# Patient Record
Sex: FEMALE | Race: WHITE | NOT HISPANIC OR LATINO | Employment: UNEMPLOYED | URBAN - METROPOLITAN AREA
[De-identification: names, ages, dates, MRNs, and addresses within clinical notes are randomized per-mention and may not be internally consistent; named-entity substitution may affect disease eponyms.]

---

## 2017-01-12 ENCOUNTER — OFFICE VISIT (OUTPATIENT)
Dept: MEDICAL GROUP | Facility: PHYSICIAN GROUP | Age: 52
End: 2017-01-12
Payer: COMMERCIAL

## 2017-01-12 ENCOUNTER — PATIENT MESSAGE (OUTPATIENT)
Dept: MEDICAL GROUP | Facility: PHYSICIAN GROUP | Age: 52
End: 2017-01-12

## 2017-01-12 ENCOUNTER — HOSPITAL ENCOUNTER (OUTPATIENT)
Dept: RADIOLOGY | Facility: MEDICAL CENTER | Age: 52
End: 2017-01-12
Attending: FAMILY MEDICINE
Payer: COMMERCIAL

## 2017-01-12 VITALS
HEIGHT: 64 IN | BODY MASS INDEX: 50.02 KG/M2 | WEIGHT: 293 LBS | SYSTOLIC BLOOD PRESSURE: 138 MMHG | TEMPERATURE: 99 F | DIASTOLIC BLOOD PRESSURE: 90 MMHG | OXYGEN SATURATION: 97 % | RESPIRATION RATE: 16 BRPM | HEART RATE: 74 BPM

## 2017-01-12 DIAGNOSIS — L70.0 CYSTIC ACNE: ICD-10-CM

## 2017-01-12 DIAGNOSIS — Z12.39 SCREENING FOR BREAST CANCER: ICD-10-CM

## 2017-01-12 DIAGNOSIS — E03.8 OTHER SPECIFIED HYPOTHYROIDISM: ICD-10-CM

## 2017-01-12 DIAGNOSIS — D71 CHRONIC GRANULOMATOUS DISEASE (HCC): ICD-10-CM

## 2017-01-12 DIAGNOSIS — M25.50 POLYARTHRALGIA: ICD-10-CM

## 2017-01-12 PROCEDURE — G0202 SCR MAMMO BI INCL CAD: HCPCS

## 2017-01-12 PROCEDURE — 11900 INJECT SKIN LESIONS </W 7: CPT | Performed by: FAMILY MEDICINE

## 2017-01-12 PROCEDURE — 99214 OFFICE O/P EST MOD 30 MIN: CPT | Mod: 25 | Performed by: FAMILY MEDICINE

## 2017-01-12 NOTE — MR AVS SNAPSHOT
"        Polly Menchaca   2017 7:00 AM   Office Visit   MRN: [id number]    Department:  [facility]   Dept Phone:  [phone]    Description:  Female : 1965   Provider:  Dorys Young M.D.           Reason for Visit     Cyst on lft side of neck    Follow-Up           Allergies as of 2017     Allergen Noted Reactions    Iodine 2010       IV iodine causes syncope    Tape 2010   Rash    SILK; may use paper tape      You were diagnosed with     Other specified hypothyroidism   [9982304]       Cystic acne   [828513]       Chronic granulomatous disease (HCC)   [167853]       Adult BMI 50.0-59.9 kg/sq m (HCC)   [869119]         Vital Signs     Blood Pressure Pulse Temperature Respirations Height Weight    138/90 mmHg 74 37.2 °C (99 °F) 16 1.626 m (5' 4.02\") 136.533 kg (301 lb)    Body Mass Index Oxygen Saturation Smoking Status             51.64 kg/m2 97% Never Smoker          Basic Information     Date Of Birth Sex Race Ethnicity Preferred Language    1965 Female White Non- English      Your appointments     2017 10:30 AM   MA [id number] with RB[facility] MG 3   [facility] ([address])    [address]  [place] [address] [phone]           No deodorant, powder, perfume or lotion under the arm or breast area.            2017  7:00 AM   Established Patient with Dorys Young M.D.   [address] [address] [phone]           You will be receiving a confirmation call a few days before your appointment from our automated call confirmation system.              Problem List              ICD-10-CM Priority Class Noted - Resolved    Other specified symptom associated with female genital organs N94.89   Unknown - Present    Stress incontinence N39.3   2014 - Present    Osteoarthritis M19.90   2014 - Present    Hypothyroidism E03.9   " 9/12/2014 - Present    Insomnia G47.00   9/12/2014 - Present    JOSE on CPAP G47.33   9/12/2014 - Present    HTN (hypertension) I10   9/12/2014 - Present    Chronic granulomatous disease (HCC) D71   9/12/2014 - Present    History of cold sores Z86.19   5/2/2016 - Present    Hypersomnia G47.10   5/6/2016 - Present    Adult BMI 50.0-59.9 kg/sq m (HCC) Z68.43   1/12/2017 - Present      Health Maintenance        Date Due Completion Dates    MAMMOGRAM 5/1/2016 5/1/2015, 9/25/2014, 9/24/2014, 9/22/2014    COLON CANCER SCREENING ANNUAL FIT 12/25/2017 12/25/2016    IMM DTaP/Tdap/Td Vaccine (2 - Td) 8/20/2025 8/20/2015            Current Immunizations     Influenza Vaccine Quad Inj (Preserved) 11/3/2016    Tdap Vaccine 8/20/2015      Below and/or attached are the medications your provider expects you to take. Review all of your home medications and newly ordered medications with your provider and/or pharmacist. Follow medication instructions as directed by your provider and/or pharmacist. Please keep your medication list with you and share with your provider. Update the information when medications are discontinued, doses are changed, or new medications (including over-the-counter products) are added; and carry medication information at all times in the event of emergency situations     Allergies:  IODINE - (reactions not documented)     TAPE - Rash               Medications  Valid as of: January 12, 2017 -  7:57 AM    Generic Name Brand Name Tablet Size Instructions for use    Clindamycin Phosphate (Gel) CLEOCIN T 1 % 1 application twice daily to affected areas        Diclofenac Sodium (Tablet Delayed Response) VOLTAREN 75 MG TAKE 1 TABLET BY MOUTH TWICE DAILY        Doxycycline Hyclate (Tab) VIBRAMYCIN 100 MG Take 1 Tab by mouth 2 times a day.        DULoxetine HCl (Cap DR Particles) CYMBALTA 30 MG Take 2 Caps by mouth every day.        HydroCHLOROthiazide (Cap) MICROZIDE 12.5 MG TAKE 1 CAPSULE BY MOUTH EVERY DAY         Levothyroxine Sodium (Tab) SYNTHROID 125 MCG Take 1 Tab by mouth Every morning on an empty stomach.        Lorcaserin HCl (Tab) BELVIQ 10 MG Take 1 Tab by mouth 2 Times a Day.        Solifenacin Succinate (Tab) VESICARE 10 MG Take 1 Tab by mouth every day.        Suvorexant (Tab) Suvorexant 15 MG Take 15 mg by mouth at bedtime as needed (for insomnia).        ValACYclovir HCl (Tab) VALTREX 1 GM TAKE 1 TABLET BY MOUTH EVERY MORNING        .                 Medicines prescribed today were sent to:     [id number] - [place], [place] - [address]    [address] [place] [address]    Phone: [phone] Fax: [phone]    Open 24 Hours?: No      Medication refill instructions:       If your prescription bottle indicates you have medication refills left, it is not necessary to call your provider’s office. Please contact your pharmacy and they will refill your medication.    If your prescription bottle indicates you do not have any refills left, you may request refills at any time through one of the following ways: The online [employer] system (except Urgent Care), by calling your provider’s office, or by asking your pharmacy to contact your provider’s office with a refill request. Medication refills are processed only during regular business hours and may not be available until the next business day. Your provider may request additional information or to have a follow-up visit with you prior to refilling your medication.   *Please Note: Medication refills are assigned a new Rx number when refilled electronically. Your pharmacy may indicate that no refills were authorized even though a new prescription for the same medication is available at the pharmacy. Please request the medicine by name with the pharmacy before contacting your provider for a refill.        Your To Do List     Future Labs/Procedures Complete By Expires    US-SOFT TISSUES OF HEAD - NECK  As  directed 1/12/2018         [employer]Search Access Code: Activation code not generated  Current StackSearch Status: Active

## 2017-01-13 NOTE — PROGRESS NOTES
Chief Complaint   Patient presents with   • Cyst     on lft side of neck   • Follow-Up       HISTORY OF PRESENT ILLNESS: Patient is a 51 y.o. female established patient here today for the following concerns:    1. Other specified hypothyroidism  Here for follow up.  Continues on thyroid replacement.  Usually has an ultrasound of the thyroid nodule once per year, but has not yet heard from her surgeon.  She reports she does feel some choking at times, she has not felt any new lumps or bumps in the neck.      2. Cystic acne  Reports cystic lesion on the left lateral neck area that has improved slighly with antibiotics (doxy) but not resolved.  She is hoping it can be treated today.      3. 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  Her son passed at age 2 from hydrocephalus following multiple shunt surgeries for severe infections and found to have CGD.  Polly brought in that genetic analysis on her today.  This reveals  X linked [id number] mutation.  She has not had any serious bacterial infections.  But is concerned about her possibility for further disease implications and has asked that we research the disease for her.      4. Adult BMI 50.0-59.9 kg/sq m ([facility])  Was started on Belviq.  She has had great weight loss with 9 lbs in the last month!  She reports no side effects.  Wants to continue therapy.      5. Polyarthralgia  Patient has had wide spread joint pain, stiffness that was originally thought to be OA and fibromyalgia.  She has found some relief from the cymbalta for pain.  She is still requiring anti-inflammatories and reports her overall endurance and strength have been a struggle.  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        Past Medical, Social, and Family history reviewed and updated in EPIC    Allergies:Iodine and Tape    Current Outpatient Prescriptions   Medication Sig Dispense Refill   • BELVIQ 10 MG Tab Take 1 Tab by mouth 2 Times a  "Day.  3   • duloxetine (CYMBALTA) 30 MG Cap DR Particles Take 2 Caps by mouth every day. 30 Cap 3   • doxycycline (VIBRAMYCIN) 100 MG Tab Take 1 Tab by mouth 2 times a day. 20 Tab 0   • diclofenac EC (VOLTAREN) 75 MG Tablet Delayed Response TAKE 1 TABLET BY MOUTH TWICE DAILY 60 Tab 2   • valacyclovir (VALTREX) 1 GM Tab TAKE 1 TABLET BY MOUTH EVERY MORNING 90 Tab 0   • clindamycin (CLEOCIN T) 1 % Gel 1 application twice daily to affected areas 1 Tube 5   • SYNTHROID 125 MCG Tab Take 1 Tab by mouth Every morning on an empty stomach. 90 Tab 3   • VESICARE 10 MG tablet Take 1 Tab by mouth every day. 90 Tab 3   • Suvorexant (BELSOMRA) 15 MG Tab Take 15 mg by mouth at bedtime as needed (for insomnia). 30 Tab 3   • hydrochlorothiazide (MICROZIDE) 12.5 MG capsule TAKE 1 CAPSULE BY MOUTH EVERY DAY 90 Cap 0     No current facility-administered medications for this visit.         ROS:  Review of Systems   Constitutional: Negative for fever, chills, weight loss and malaise/fatigue.   HENT: Negative for ear pain, nosebleeds, congestion, sore throat and neck pain.    Eyes: Negative for blurred vision.   Respiratory: Negative for cough, sputum production, shortness of breath and wheezing.    Cardiovascular: Negative for chest pain, palpitations,  and leg swelling.   Gastrointestinal: Negative for heartburn, nausea, vomiting, diarrhea and abdominal pain.   Genitourinary: Negative for dysuria, urgency and frequency.   Musculoskeletal: Negative for myalgias, back pain and joint pain.   Skin: Negative for rash and itching.   Neurological: Negative for dizziness, tingling, tremors, sensory change, focal weakness and headaches.   Endo/Heme/Allergies: Does not bruise/bleed easily.   Psychiatric/Behavioral: Negative for depression, anxiety, suicidal ideas, insomnia and memory loss.      Exam:  Blood pressure 138/90, pulse 74, temperature 37.2 °C (99 °F), resp. rate 16, height 1.626 m (5' 4.02\"), weight 136.533 kg (301 lb), SpO2 97 " %.    General:  Well nourished, well developed in NAD  Head is grossly normal.  Neck: Supple without JVD   Pulmonary:  Normal effort.   Cardiovascular: Regular rate  Extremities: no clubbing, cyanosis, or edema.  Psych: affect appropriate      Please note that this dictation was created using voice recognition software. I have made every reasonable attempt to correct obvious errors, but I expect that there are errors of grammar and possibly content that I did not discover before finalizing the note.    Assessment/Plan:  1. Other specified hypothyroidism  Recheck thyroid of thyroid nodule.    - US-SOFT TISSUES OF HEAD - NECK; Future    2. Cystic acne  S/p injection, if not improving     3. Chronic granulomatous disease (HCC)  See scanned document on genetic testing report.  Patient has [id number] mutation.    From Up to Date:  X-linked carriers -- The X-linked carrier state for [id number] is not entirely silent. In affected women, lyonization (ie, the inactivation of one or the other X chromosome in every cell) leads to two populations of phagocytes: one with normal respiratory burst function and the other with impaired respiratory burst activity [90]. Therefore, X-linked CGD carriers display a characteristic mosaic pattern on respiratory burst testing of individual peripheral blood cells seen microscopically (nitroblue tetrazolium [NBT] test) or by flow cytometry (dihydrorhodamine 123 [DHR] test). (See 'Neutrophil function tests' below.)  As few as 20 percent of cells having normal respiratory burst activity is sufficient to prevent most severe bacterial and fungal infections. Thus, most female carriers of X-linked [id number] CGD mutations are not compromised in their ability to handle infectious challenges. However, carriers with less than 20 percent of normal oxidase activity due to skewed X-chromosome lyonization may present with the phenotype of mild to severe CGD [91-94]. In addition, progressive skewing of  X-chromosome inactivation with age in previously healthy carriers of [id number] null mutations can lead to late-onset manifestations of CGD [95]. Females may have other manifestations of heterozygous carriage of X-linked CGD mutations, including discoid lupus erythematosus, aphthous ulcers, chorioretinal lesions, and photosensitivity [96,97].    We will additional lab tests and refer to rheumatology to help us determine if her chronic wide spread pain and fatigue may actually be from lupus.    - REFERRAL TO RHEUMATOLOGY  - ANTI-NUCLEAR ANTIBODY SERUM; Future  - WESTERGREN SED RATE; Future  - CRP QUANTITIVE (NON-CARDIAC); Future  - RHEUMATOID ARTHRITIS FACTOR; Future  - CCP    4. Adult BMI 50.0-59.9 kg/sq m (HCC)  Continue Belviq     5. Polyarthralgia    - REFERRAL TO RHEUMATOLOGY  - ANTI-NUCLEAR ANTIBODY SERUM; Future  - WESTERGREN SED RATE; Future  - CRP QUANTITIVE (NON-CARDIAC); Future  - RHEUMATOID ARTHRITIS FACTOR; Future  - CCP    followup in 1 month.

## 2017-01-20 RX ORDER — HYDROCHLOROTHIAZIDE 12.5 MG/1
CAPSULE, GELATIN COATED ORAL
Qty: 90 CAP | Refills: 0 | Status: SHIPPED | OUTPATIENT
Start: 2017-01-20 | End: 2017-04-07 | Stop reason: SDUPTHER

## 2017-01-25 ENCOUNTER — HOSPITAL ENCOUNTER (OUTPATIENT)
Dept: LAB | Facility: MEDICAL CENTER | Age: 52
End: 2017-01-25
Attending: FAMILY MEDICINE
Payer: COMMERCIAL

## 2017-01-25 DIAGNOSIS — D71 CHRONIC GRANULOMATOUS DISEASE (HCC): ICD-10-CM

## 2017-01-25 DIAGNOSIS — M25.50 POLYARTHRALGIA: ICD-10-CM

## 2017-01-25 LAB
CRP SERPL HS-MCNC: 1.67 MG/DL (ref 0–0.75)
ERYTHROCYTE [SEDIMENTATION RATE] IN BLOOD BY WESTERGREN METHOD: 29 MM/HOUR (ref 0–30)
RHEUMATOID FACT SERPL-ACNC: <10 IU/ML (ref 0–14)

## 2017-01-25 PROCEDURE — 85652 RBC SED RATE AUTOMATED: CPT

## 2017-01-25 PROCEDURE — 86200 CCP ANTIBODY: CPT

## 2017-01-25 PROCEDURE — 86431 RHEUMATOID FACTOR QUANT: CPT

## 2017-01-25 PROCEDURE — 86140 C-REACTIVE PROTEIN: CPT

## 2017-01-25 PROCEDURE — 86038 ANTINUCLEAR ANTIBODIES: CPT

## 2017-01-25 PROCEDURE — 86039 ANTINUCLEAR ANTIBODIES (ANA): CPT

## 2017-01-25 PROCEDURE — 36415 COLL VENOUS BLD VENIPUNCTURE: CPT

## 2017-01-27 ENCOUNTER — TELEPHONE (OUTPATIENT)
Dept: MEDICAL GROUP | Facility: PHYSICIAN GROUP | Age: 52
End: 2017-01-27

## 2017-01-27 DIAGNOSIS — D71 CHRONIC GRANULOMATOUS DISEASE (HCC): ICD-10-CM

## 2017-01-27 DIAGNOSIS — M25.50 POLYARTHRALGIA: ICD-10-CM

## 2017-01-27 DIAGNOSIS — R76.8 POSITIVE ANTI-CCP TEST: ICD-10-CM

## 2017-01-27 DIAGNOSIS — R76.8 POSITIVE ANA (ANTINUCLEAR ANTIBODY): ICD-10-CM

## 2017-01-27 LAB — CCP IGG SERPL-ACNC: 22 UNITS (ref 0–19)

## 2017-01-28 LAB
NUCLEAR IGG SER QL IA: DETECTED
NUCLEAR IGG TITR SER IF: ABNORMAL {TITER}

## 2017-01-28 NOTE — TELEPHONE ENCOUNTER
----- Message from Dorys Young M.D. sent at 1/27/2017  4:52 PM PST -----  Patient has positive antibodies for possible lupus.  Recommend referral to rheumatology.  This may explain her joint pains.  Continue current medications.

## 2017-02-15 ENCOUNTER — TELEPHONE (OUTPATIENT)
Dept: MEDICAL GROUP | Facility: PHYSICIAN GROUP | Age: 52
End: 2017-02-15

## 2017-02-15 NOTE — TELEPHONE ENCOUNTER
I really think this patient deserves a look at by rheumatology.  Can you please look again?  She has X-linked chronic granulomatous disease (genetic testing completed) which increases her risk for RA and lupus with a positive JETT.

## 2017-02-15 NOTE — TELEPHONE ENCOUNTER
----- Message from Mireille Lopez sent at 2/15/2017  8:13 AM PST -----  Good morning Dr. Young.    I wanted to notify you that the referral for the above pt to [facility] has been declined at this time.  If you have any additional questions, please reach out to one of our providers. Thank you for the referral.

## 2017-02-17 ENCOUNTER — HOSPITAL ENCOUNTER (OUTPATIENT)
Dept: RADIOLOGY | Facility: MEDICAL CENTER | Age: 52
End: 2017-02-17
Attending: FAMILY MEDICINE
Payer: COMMERCIAL

## 2017-02-17 DIAGNOSIS — E03.8 OTHER SPECIFIED HYPOTHYROIDISM: ICD-10-CM

## 2017-02-17 PROCEDURE — 76536 US EXAM OF HEAD AND NECK: CPT

## 2017-02-23 ENCOUNTER — HOSPITAL ENCOUNTER (OUTPATIENT)
Facility: MEDICAL CENTER | Age: 52
End: 2017-02-23
Attending: FAMILY MEDICINE
Payer: COMMERCIAL

## 2017-02-23 ENCOUNTER — TELEPHONE (OUTPATIENT)
Dept: MEDICAL GROUP | Facility: PHYSICIAN GROUP | Age: 52
End: 2017-02-23

## 2017-02-23 ENCOUNTER — OFFICE VISIT (OUTPATIENT)
Dept: MEDICAL GROUP | Facility: PHYSICIAN GROUP | Age: 52
End: 2017-02-23
Payer: COMMERCIAL

## 2017-02-23 VITALS
WEIGHT: 293 LBS | RESPIRATION RATE: 16 BRPM | HEIGHT: 64 IN | TEMPERATURE: 97.9 F | BODY MASS INDEX: 50.02 KG/M2 | SYSTOLIC BLOOD PRESSURE: 130 MMHG | HEART RATE: 78 BPM | OXYGEN SATURATION: 95 % | DIASTOLIC BLOOD PRESSURE: 82 MMHG

## 2017-02-23 DIAGNOSIS — R31.9 HEMATURIA: ICD-10-CM

## 2017-02-23 DIAGNOSIS — M25.50 POLYARTHRALGIA: ICD-10-CM

## 2017-02-23 DIAGNOSIS — R76.8 POSITIVE ANA (ANTINUCLEAR ANTIBODY): ICD-10-CM

## 2017-02-23 DIAGNOSIS — N10 ACUTE PYELONEPHRITIS: ICD-10-CM

## 2017-02-23 DIAGNOSIS — E66.01 OBESITY, MORBID, BMI 50 OR HIGHER (HCC): ICD-10-CM

## 2017-02-23 DIAGNOSIS — D71 CHRONIC GRANULOMATOUS DISEASE (HCC): ICD-10-CM

## 2017-02-23 LAB
APPEARANCE UR: NORMAL
BILIRUB UR STRIP-MCNC: NORMAL MG/DL
COLOR UR AUTO: NORMAL
GLUCOSE UR STRIP.AUTO-MCNC: NEGATIVE MG/DL
KETONES UR STRIP.AUTO-MCNC: NEGATIVE MG/DL
LEUKOCYTE ESTERASE UR QL STRIP.AUTO: NORMAL
NITRITE UR QL STRIP.AUTO: NEGATIVE
PH UR STRIP.AUTO: 6 [PH] (ref 5–8)
PROT UR QL STRIP: NEGATIVE MG/DL
RBC UR QL AUTO: NEGATIVE
SP GR UR STRIP.AUTO: 1.02
UROBILINOGEN UR STRIP-MCNC: NEGATIVE MG/DL

## 2017-02-23 PROCEDURE — 87086 URINE CULTURE/COLONY COUNT: CPT

## 2017-02-23 PROCEDURE — 99214 OFFICE O/P EST MOD 30 MIN: CPT | Performed by: FAMILY MEDICINE

## 2017-02-23 PROCEDURE — 81002 URINALYSIS NONAUTO W/O SCOPE: CPT | Performed by: FAMILY MEDICINE

## 2017-02-23 RX ORDER — SULFAMETHOXAZOLE AND TRIMETHOPRIM 800; 160 MG/1; MG/1
1 TABLET ORAL 2 TIMES DAILY
Qty: 28 TAB | Refills: 0 | Status: SHIPPED | OUTPATIENT
Start: 2017-02-23 | End: 2017-04-27

## 2017-02-23 NOTE — TELEPHONE ENCOUNTER
----- Message from Dorys Young M.D. sent at 2/23/2017  8:38 AM PST -----  Please call Polly.  There is a urine infection.  I have sent in antibiotics to treat it.

## 2017-02-23 NOTE — PROGRESS NOTES
Chief Complaint   Patient presents with   • Medication Refill     Belviq       HISTORY OF PRESENT ILLNESS: Patient is a 51 y.o. female established patient here today for the following concerns:    1. Hematuria 6. Acute pyelonephritis  Here today with concerns over gross hematuria about 3 days ago associated with fatigue, fevers (subjective), chills, sweats and left sided flank pain.  Reports that she has been feeling particularly sick.  Some associated nausea.  No vomiting.  No dizziness.      2. Positive JETT (antinuclear antibody)  3. Chronic granulomatous disease (HCC)  4. Polyarthralgia    [id number] mutation positive (x-linked carrier).  + JETT although low titers.  High CRP.  Has been experiencing bilateral hand stiffness swelling redness.  Red rash over the cheeks.  Rash over the lower extremities consistent with possible erythema nodosum.  She has had bilateral shoulder pains and ankle pains.  She describes excessive fatigue.  Initial referral to rheumatology was declined.  This has been rerouted to additional rheumatology office but patient has not yet been scheduled for consultation.        5. Obesity, morbid, BMI 50 or higher (CMS-HCC)  Has had good success with Belviq.  Down another 6 lbs from last month. Down 22 lbs from heaviest.            Past Medical, Social, and Family history reviewed and updated in EPIC    Allergies:Iodine and Tape    Current Outpatient Prescriptions   Medication Sig Dispense Refill   • BELVIQ 10 MG Tab Take 1 Tab by mouth 2 Times a Day. 60 Tab 1   • sulfamethoxazole-trimethoprim (BACTRIM DS) 800-160 MG tablet Take 1 Tab by mouth 2 times a day. 28 Tab 0   • hydrochlorothiazide (MICROZIDE) 12.5 MG capsule TAKE 1 CAPSULE BY MOUTH EVERY DAY 90 Cap 0   • duloxetine (CYMBALTA) 30 MG Cap DR Particles Take 2 Caps by mouth every day. 30 Cap 3   • doxycycline (VIBRAMYCIN) 100 MG Tab Take 1 Tab by mouth 2 times a day. 20 Tab 0   • diclofenac EC (VOLTAREN) 75 MG Tablet Delayed Response TAKE 1  "TABLET BY MOUTH TWICE DAILY 60 Tab 2   • valacyclovir (VALTREX) 1 GM Tab TAKE 1 TABLET BY MOUTH EVERY MORNING 90 Tab 0   • clindamycin (CLEOCIN T) 1 % Gel 1 application twice daily to affected areas 1 Tube 5   • SYNTHROID 125 MCG Tab Take 1 Tab by mouth Every morning on an empty stomach. 90 Tab 3   • VESICARE 10 MG tablet Take 1 Tab by mouth every day. 90 Tab 3   • Suvorexant (BELSOMRA) 15 MG Tab Take 15 mg by mouth at bedtime as needed (for insomnia). 30 Tab 3     No current facility-administered medications for this visit.         ROS:  Review of Systems   Constitutional: Negative for fever, chills, weight loss and malaise/fatigue.   HENT: Negative for ear pain, nosebleeds, congestion, sore throat and neck pain.    Eyes: Negative for blurred vision.   Respiratory: Negative for cough, sputum production, shortness of breath and wheezing.    Cardiovascular: Negative for chest pain, palpitations,  and leg swelling.   Gastrointestinal: Negative for heartburn, nausea, vomiting, diarrhea and abdominal pain.   Genitourinary: Negative for dysuria, urgency and frequency.   Musculoskeletal: Negative for myalgias, back pain and joint pain.   Skin: Negative for rash and itching.   Neurological: Negative for dizziness, tingling, tremors, sensory change, focal weakness and headaches.   Endo/Heme/Allergies: Does not bruise/bleed easily.   Psychiatric/Behavioral: Negative for depression, anxiety, suicidal ideas, insomnia and memory loss.      Exam:  Blood pressure 130/82, pulse 78, temperature 36.6 °C (97.9 °F), resp. rate 16, height 1.626 m (5' 4.02\"), weight 133.811 kg (295 lb), SpO2 95 %.    General:  Well nourished, well developed in NAD  Head is grossly normal.  Neck: Supple without JVD   Pulmonary:  Normal effort.   Cardiovascular: Regular rate  Extremities: no clubbing, cyanosis, or edema.  Psych: affect appropriate    Please note that this dictation was created using voice recognition software. I have made every reasonable " attempt to correct obvious errors, but I expect that there are errors of grammar and possibly content that I did not discover before finalizing the note.    Assessment/Plan:  1. Hematuria    - POCT Urinalysis consistent with infection  Start Bactrim follow culture  - URINE CULTURE(NEW); Future    2. Positive JETT (antinuclear antibody)  Rheumatology consultation pending. Concerning for discoid lupus or SLE.      3. Chronic granulomatous disease (HCC)   X-linked carriers -- The X-linked carrier state for [id number] is not entirely silent. In affected women, lyonization (ie, the inactivation of one or the other X chromosome in every cell) leads to two populations of phagocytes: one with normal respiratory burst function and the other with impaired respiratory burst activity [90]. Therefore, X-linked CGD carriers display a characteristic mosaic pattern on respiratory burst testing of individual peripheral blood cells seen microscopically (nitroblue tetrazolium [NBT] test) or by flow cytometry (dihydrorhodamine 123 [DHR] test). (See 'Neutrophil function tests' below.)  As few as 20 percent of cells having normal respiratory burst activity is sufficient to prevent most severe bacterial and fungal infections. Thus, most female carriers of X-linked [id number] CGD mutations are not compromised in their ability to handle infectious challenges. However, carriers with less than 20 percent of normal oxidase activity due to skewed X-chromosome lyonization may present with the phenotype of mild to severe CGD [91-94]. In addition, progressive skewing of X-chromosome inactivation with age in previously healthy carriers of [id number] null mutations can lead to late-onset manifestations of CGD [95]. Females may have other manifestations of heterozygous carriage of X-linked CGD mutations, including discoid lupus erythematosus, aphthous ulcers, chorioretinal lesions, and photosensitivity [96,97].      4. Polyarthralgia  Concerning for  autoimmune or inflammatory arthropathy.  Since she has pyelo at this time will avoid steroids and treat infection first.      5. Obesity, morbid, BMI 50 or higher (CMS-HCC)  - BELVIQ 10 MG Tab; Take 1 Tab by mouth 2 Times a Day.  Dispense: 60 Tab; Refill: 1    6. Acute pyelonephritis  - sulfamethoxazole-trimethoprim (BACTRIM DS) 800-160 MG tablet; Take 1 Tab by mouth 2 times a day.  Dispense: 28 Tab; Refill: 0  Follow culture.

## 2017-02-23 NOTE — MR AVS SNAPSHOT
"        Polly Menchaca   2017 7:00 AM   Office Visit   MRN: [id number]    Department:  [facility]   Dept Phone:  [phone]    Description:  Female : 1965   Provider:  Dorys Young M.D.           Reason for Visit     Medication Refill [facility]      Allergies as of 2017     Allergen Noted Reactions    Iodine 2010       IV iodine causes syncope    Tape 2010   Rash    SILK; may use paper tape      You were diagnosed with     Hematuria   [2700443]       Positive JETT (antinuclear antibody)   [141079]       Chronic granulomatous disease (CMS-HCC)   [468738]       Polyarthralgia   [024478]       Obesity, morbid, BMI 50 or higher (CMS-HCC)   [818585]         Vital Signs     Blood Pressure Pulse Temperature Respirations Height Weight    130/82 mmHg 78 36.6 °C (97.9 °F) 16 1.626 m (5' 4.02\") 133.811 kg (295 lb)    Body Mass Index Oxygen Saturation Smoking Status             50.61 kg/m2 95% Never Smoker          Basic Information     Date Of Birth Sex Race Ethnicity Preferred Language    1965 Female White Non- English      Your appointments     Mar 24, 2017  7:40 AM   Established Patient with Dorys Young M.D.   [address] [id number] [phone]           You will be receiving a confirmation call a few days before your appointment from our automated call confirmation system.            2017  7:00 AM   Established Patient with Dorys Young M.D.   [address] [id number] [phone]           You will be receiving a confirmation call a few days before your appointment from our automated call confirmation system.              Problem List              ICD-10-CM Priority Class Noted - Resolved    Other specified symptom associated with female genital organs N94.89   Unknown - Present    Stress incontinence N39.3   " 5/27/2014 - Present    Osteoarthritis M19.90   9/12/2014 - Present    Hypothyroidism E03.9   9/12/2014 - Present    Insomnia G47.00   9/12/2014 - Present    JOSE on CPAP G47.33   9/12/2014 - Present    HTN (hypertension) I10   9/12/2014 - Present    Chronic granulomatous disease (HCC) D71   9/12/2014 - Present    History of cold sores Z86.19   5/2/2016 - Present    Hypersomnia G47.10   5/6/2016 - Present    Adult BMI 50.0-59.9 kg/sq m (CMS-HCC) Z68.43   1/12/2017 - Present      Health Maintenance        Date Due Completion Dates    COLON CANCER SCREENING ANNUAL FIT 12/25/2017 12/25/2016    MAMMOGRAM 1/12/2018 1/12/2017, 5/1/2015, 9/25/2014, 9/24/2014, 9/22/2014    IMM DTaP/Tdap/Td Vaccine (2 - Td) 8/20/2025 8/20/2015            Current Immunizations     Influenza Vaccine Quad Inj (Preserved) 11/3/2016    Tdap Vaccine 8/20/2015      Below and/or attached are the medications your provider expects you to take. Review all of your home medications and newly ordered medications with your provider and/or pharmacist. Follow medication instructions as directed by your provider and/or pharmacist. Please keep your medication list with you and share with your provider. Update the information when medications are discontinued, doses are changed, or new medications (including over-the-counter products) are added; and carry medication information at all times in the event of emergency situations     Allergies:  IODINE - (reactions not documented)     TAPE - Rash               Medications  Valid as of: February 23, 2017 -  7:29 AM    Generic Name Brand Name Tablet Size Instructions for use    Clindamycin Phosphate (Gel) CLEOCIN T 1 % 1 application twice daily to affected areas        Diclofenac Sodium (Tablet Delayed Response) VOLTAREN 75 MG TAKE 1 TABLET BY MOUTH TWICE DAILY        Doxycycline Hyclate (Tab) VIBRAMYCIN 100 MG Take 1 Tab by mouth 2 times a day.        DULoxetine HCl (Cap DR Particles) CYMBALTA 30 MG Take 2 Caps by  mouth every day.        HydroCHLOROthiazide (Cap) MICROZIDE 12.5 MG TAKE 1 CAPSULE BY MOUTH EVERY DAY        Levothyroxine Sodium (Tab) SYNTHROID 125 MCG Take 1 Tab by mouth Every morning on an empty stomach.        Lorcaserin HCl (Tab) BELVIQ 10 MG Take 1 Tab by mouth 2 Times a Day.        Solifenacin Succinate (Tab) VESICARE 10 MG Take 1 Tab by mouth every day.        Suvorexant (Tab) Suvorexant 15 MG Take 15 mg by mouth at bedtime as needed (for insomnia).        ValACYclovir HCl (Tab) VALTREX 1 GM TAKE 1 TABLET BY MOUTH EVERY MORNING        .                 Medicines prescribed today were sent to:     [facility] [address] [place] [address], [place] - [address] [facility] [address] [place] [address] & [place]    [address] [facility] [address] [place] [address]    Phone: [phone] Fax: [phone]    Open 24 Hours?: No      Medication refill instructions:       If your prescription bottle indicates you have medication refills left, it is not necessary to call your provider’s office. Please contact your pharmacy and they will refill your medication.    If your prescription bottle indicates you do not have any refills left, you may request refills at any time through one of the following ways: The online [employer] system (except Urgent Care), by calling your provider’s office, or by asking your pharmacy to contact your provider’s office with a refill request. Medication refills are processed only during regular business hours and may not be available until the next business day. Your provider may request additional information or to have a follow-up visit with you prior to refilling your medication.   *Please Note: Medication refills are assigned a new Rx number when refilled electronically. Your pharmacy may indicate that no refills were authorized even though a new prescription for the same medication is available at the pharmacy. Please request the medicine by name with the pharmacy before contacting your provider for a  refill.           MyChart Access Code: Activation code not generated  Current [employer] Status: Active

## 2017-02-23 NOTE — TELEPHONE ENCOUNTER
(Patient) has been notified, and understood.  She would also like to know if you were going to send her a prescription for steroids for after?  Please advise.    Thank you.

## 2017-02-25 LAB
BACTERIA UR CULT: ABNORMAL
BACTERIA UR CULT: ABNORMAL
SIGNIFICANT IND 70042: ABNORMAL
SOURCE SOURCE: ABNORMAL

## 2017-02-28 ENCOUNTER — TELEPHONE (OUTPATIENT)
Dept: MEDICAL GROUP | Facility: PHYSICIAN GROUP | Age: 52
End: 2017-02-28

## 2017-02-28 NOTE — TELEPHONE ENCOUNTER
----- Message from Dorys Young M.D. sent at 2/28/2017 10:14 AM PST -----  Please let Polly know that the dysuria may be a vaginal infection.  Please have her finish the antibiotics completely.  If she is still having symptoms, I need to know, so we may need to treat for vaginal infection (gardnerella)

## 2017-02-28 NOTE — TELEPHONE ENCOUNTER
Left a message for the patient to call back for lab results.  Also sent the patient a [facility] message with results.

## 2017-03-02 RX ORDER — VALACYCLOVIR HYDROCHLORIDE 1 G/1
TABLET, FILM COATED ORAL
Qty: 90 TAB | Refills: 0 | Status: SHIPPED | OUTPATIENT
Start: 2017-03-02 | End: 2017-07-05 | Stop reason: SDUPTHER

## 2017-03-02 NOTE — TELEPHONE ENCOUNTER
Was the patient seen in the last year in this department? Yes     Does patient have an active prescription for medications requested? No     Received Request Via: Pharmacy      Pt met protocol?: Yes, last ov 2/23/17

## 2017-03-08 ENCOUNTER — PATIENT MESSAGE (OUTPATIENT)
Dept: MEDICAL GROUP | Facility: PHYSICIAN GROUP | Age: 52
End: 2017-03-08

## 2017-03-08 DIAGNOSIS — M25.519 CHRONIC SHOULDER PAIN, UNSPECIFIED LATERALITY: ICD-10-CM

## 2017-03-08 DIAGNOSIS — G89.29 CHRONIC SHOULDER PAIN, UNSPECIFIED LATERALITY: ICD-10-CM

## 2017-03-20 RX ORDER — DULOXETIN HYDROCHLORIDE 30 MG/1
CAPSULE, DELAYED RELEASE ORAL
Qty: 60 CAP | Refills: 0 | Status: SHIPPED | OUTPATIENT
Start: 2017-03-20 | End: 2017-03-24 | Stop reason: SDUPTHER

## 2017-03-20 NOTE — TELEPHONE ENCOUNTER
Was the patient seen in the last year in this department? Yes     Does patient have an active prescription for medications requested? No     Received Request Via: Pharmacy      Pt met protocol?: Yes, LABS 1/17 OV 2/17

## 2017-03-24 ENCOUNTER — OFFICE VISIT (OUTPATIENT)
Dept: MEDICAL GROUP | Facility: PHYSICIAN GROUP | Age: 52
End: 2017-03-24
Payer: COMMERCIAL

## 2017-03-24 VITALS
OXYGEN SATURATION: 97 % | SYSTOLIC BLOOD PRESSURE: 120 MMHG | HEIGHT: 64 IN | BODY MASS INDEX: 50.02 KG/M2 | HEART RATE: 70 BPM | RESPIRATION RATE: 18 BRPM | WEIGHT: 293 LBS | DIASTOLIC BLOOD PRESSURE: 78 MMHG | TEMPERATURE: 97.4 F

## 2017-03-24 DIAGNOSIS — R21 MALAR RASH: ICD-10-CM

## 2017-03-24 DIAGNOSIS — R76.8 POSITIVE ANA (ANTINUCLEAR ANTIBODY): ICD-10-CM

## 2017-03-24 DIAGNOSIS — R76.8 POSITIVE ANTI-CCP TEST: ICD-10-CM

## 2017-03-24 DIAGNOSIS — M19.90 INFLAMMATORY ARTHROPATHY: ICD-10-CM

## 2017-03-24 DIAGNOSIS — M25.50 POLYARTHRALGIA: ICD-10-CM

## 2017-03-24 PROCEDURE — 99214 OFFICE O/P EST MOD 30 MIN: CPT | Performed by: FAMILY MEDICINE

## 2017-03-24 RX ORDER — PREDNISONE 10 MG/1
TABLET ORAL
Qty: 63 TAB | Refills: 0 | Status: SHIPPED | OUTPATIENT
Start: 2017-03-24 | End: 2017-04-27

## 2017-03-24 RX ORDER — DULOXETIN HYDROCHLORIDE 60 MG/1
60 CAPSULE, DELAYED RELEASE ORAL
Qty: 90 CAP | Refills: 3 | Status: SHIPPED | OUTPATIENT
Start: 2017-03-24 | End: 2017-05-26 | Stop reason: SDUPTHER

## 2017-03-24 NOTE — PROGRESS NOTES
Chief Complaint   Patient presents with   • Pain       HISTORY OF PRESENT ILLNESS: Patient is a 51 y.o. female established patient here today for the following concerns:      Polly is here for follow up.  She is struggling with diffuse pain, stiffness, rash over the cheeks with positive JETT and CCP antibodies.  Pending rheumatology referral. She reports that cymbalta does help some.  Using diclofenac too.  Not getting enough relief.      Patient has Chronic Granulomatous Disease (carrier state)  - + [id number] on genetic testing.    X-linked carriers -- The X-linked carrier state for [id number] is not entirely silent. In affected women, lyonization (ie, the inactivation of one or the other X chromosome in every cell) leads to two populations of phagocytes: one with normal respiratory burst function and the other with impaired respiratory burst activity [90]. Therefore, X-linked CGD carriers display a characteristic mosaic pattern on respiratory burst testing of individual peripheral blood cells seen microscopically (nitroblue tetrazolium [NBT] test) or by flow cytometry (dihydrorhodamine 123 [DHR] test). (See 'Neutrophil function tests' below.)    Past Medical, Social, and Family history reviewed and updated in EPIC    Allergies:Iodine and Tape    Current Outpatient Prescriptions   Medication Sig Dispense Refill   • predniSONE (DELTASONE) 10 MG Tab Take 40 mg for 3 days, then 30 mg for 3 days, then 20 mg for 3 days, then 10 mg for 3 days 63 Tab 0   • duloxetine (CYMBALTA) 60 MG Cap DR Particles delayed-release capsule Take 1 Cap by mouth every day. 90 Cap 3   • valacyclovir (VALTREX) 1 GM Tab TAKE 1 TABLET BY MOUTH EVERY MORNING 90 Tab 0   • BELVIQ 10 MG Tab Take 1 Tab by mouth 2 Times a Day. 60 Tab 1   • sulfamethoxazole-trimethoprim (BACTRIM DS) 800-160 MG tablet Take 1 Tab by mouth 2 times a day. 28 Tab 0   • hydrochlorothiazide (MICROZIDE) 12.5 MG capsule TAKE 1 CAPSULE BY MOUTH EVERY DAY 90 Cap 0   • doxycycline  "(VIBRAMYCIN) 100 MG Tab Take 1 Tab by mouth 2 times a day. 20 Tab 0   • diclofenac EC (VOLTAREN) 75 MG Tablet Delayed Response TAKE 1 TABLET BY MOUTH TWICE DAILY 60 Tab 2   • clindamycin (CLEOCIN T) 1 % Gel 1 application twice daily to affected areas 1 Tube 5   • SYNTHROID 125 MCG Tab Take 1 Tab by mouth Every morning on an empty stomach. 90 Tab 3   • VESICARE 10 MG tablet Take 1 Tab by mouth every day. 90 Tab 3   • Suvorexant (BELSOMRA) 15 MG Tab Take 15 mg by mouth at bedtime as needed (for insomnia). 30 Tab 3     No current facility-administered medications for this visit.         ROS:  Review of Systems   Constitutional: Negative for fever, chills, weight loss and malaise/fatigue.   HENT: Negative for ear pain, nosebleeds, congestion, sore throat and neck pain.    Eyes: Negative for blurred vision.   Respiratory: Negative for cough, sputum production, shortness of breath and wheezing.    Cardiovascular: Negative for chest pain, palpitations,  and leg swelling.   Gastrointestinal: Negative for heartburn, nausea, vomiting, diarrhea and abdominal pain.   Genitourinary: Negative for dysuria, urgency and frequency.   Musculoskeletal:+for myalgias, back pain and joint pain.   Skin: +for rash and itching.   Neurological: Negative for dizziness, tingling, tremors, sensory change, focal weakness and headaches.   Endo/Heme/Allergies: Does not bruise/bleed easily.   Psychiatric/Behavioral: Negative for depression, anxiety, suicidal ideas, insomnia and memory loss.      Exam:  Blood pressure 120/78, pulse 70, temperature 36.3 °C (97.4 °F), resp. rate 18, height 1.626 m (5' 4.02\"), weight 134.718 kg (297 lb), SpO2 97 %.    General:  Well nourished, well developed in NAD  Head is grossly normal.  Neck: Supple without JVD   Pulmonary:  Normal effort.   Cardiovascular: Regular rate  Extremities: no clubbing, cyanosis, or edema.  Psych: affect appropriate      Please note that this dictation was created using voice recognition " software. I have made every reasonable attempt to correct obvious errors, but I expect that there are errors of grammar and possibly content that I did not discover before finalizing the note.    Assessment/Plan:  1. Inflammatory arthropathy  Trial of   - predniSONE (DELTASONE) 10 MG Tab; Take 40 mg for 3 days, then 30 mg for 3 days, then 20 mg for 3 days, then 10 mg for 3 days  Dispense: 63 Tab; Refill: 0    2. Polyarthralgia  - predniSONE (DELTASONE) 10 MG Tab; Take 40 mg for 3 days, then 30 mg for 3 days, then 20 mg for 3 days, then 10 mg for 3 days  Dispense: 63 Tab; Refill: 0  - duloxetine (CYMBALTA) 60 MG Cap DR Particles delayed-release capsule; Take 1 Cap by mouth every day.  Dispense: 90 Cap; Refill: 3    3. Positive JETT (antinuclear antibody)  - predniSONE (DELTASONE) 10 MG Tab; Take 40 mg for 3 days, then 30 mg for 3 days, then 20 mg for 3 days, then 10 mg for 3 days  Dispense: 63 Tab; Refill: 0    4. Positive anti-CCP test  - predniSONE (DELTASONE) 10 MG Tab; Take 40 mg for 3 days, then 30 mg for 3 days, then 20 mg for 3 days, then 10 mg for 3 days  Dispense: 63 Tab; Refill: 0    5. Malar rash  - predniSONE (DELTASONE) 10 MG Tab; Take 40 mg for 3 days, then 30 mg for 3 days, then 20 mg for 3 days, then 10 mg for 3 days  Dispense: 63 Tab; Refill: 0    Polly will check into see if Dr. Jefferson has had a chance to review her case and if he will consult.  We will do a trial of prednisone, discussed risks and benefits.   Handicap placard form completed today.

## 2017-03-24 NOTE — MR AVS SNAPSHOT
"        Polly Anns   3/24/2017 7:40 AM   Office Visit   MRN: [id number]    Department:  [facility]   Dept Phone:  [phone]    Description:  Female : 1965   Provider:  Dorys Young M.D.           Reason for Visit     Pain           Allergies as of 3/24/2017     Allergen Noted Reactions    Iodine 2010       IV iodine causes syncope    Tape 2010   Rash    SILK; may use paper tape      You were diagnosed with     Inflammatory arthropathy   [155337]       Polyarthralgia   [772751]       Positive JETT (antinuclear antibody)   [174073]       Positive anti-CCP test   [193023]       Malar rash   [696658]         Vital Signs     Blood Pressure Pulse Temperature Respirations Height Weight    120/78 mmHg 70 36.3 °C (97.4 °F) 18 1.626 m (5' 4.02\") 134.718 kg (297 lb)    Body Mass Index Oxygen Saturation Smoking Status             50.95 kg/m2 97% Never Smoker          Basic Information     Date Of Birth Sex Race Ethnicity Preferred Language    1965 Female White Non- English      Your appointments     2017  7:00 AM   Established Patient with Dorys Young M.D.   [address] [address] [phone]           You will be receiving a confirmation call a few days before your appointment from our automated call confirmation system.            May 26, 2017  7:00 AM   Established Patient with Dorys Young M.D.   [address] [id number] [phone]           You will be receiving a confirmation call a few days before your appointment from our automated call confirmation system.              Problem List              ICD-10-CM Priority Class Noted - Resolved    Other specified symptom associated with female genital organs N94.89   Unknown - Present    Stress incontinence N39.3   2014 - Present    Osteoarthritis M19.90   2014 " - Present    Hypothyroidism E03.9   9/12/2014 - Present    Insomnia G47.00   9/12/2014 - Present    JOSE on CPAP G47.33   9/12/2014 - Present    HTN (hypertension) I10   9/12/2014 - Present    Chronic granulomatous disease (HCC) D71   9/12/2014 - Present    History of cold sores Z86.19   5/2/2016 - Present    Hypersomnia G47.10   5/6/2016 - Present    Adult BMI 50.0-59.9 kg/sq m (CMS-HCC) Z68.43   1/12/2017 - Present      Health Maintenance        Date Due Completion Dates    COLON CANCER SCREENING ANNUAL FIT 12/25/2017 12/25/2016    MAMMOGRAM 1/12/2018 1/12/2017, 5/1/2015, 9/25/2014, 9/24/2014, 9/22/2014    IMM DTaP/Tdap/Td Vaccine (2 - Td) 8/20/2025 8/20/2015            Current Immunizations     Influenza Vaccine Quad Inj (Preserved) 11/3/2016    Tdap Vaccine 8/20/2015      Below and/or attached are the medications your provider expects you to take. Review all of your home medications and newly ordered medications with your provider and/or pharmacist. Follow medication instructions as directed by your provider and/or pharmacist. Please keep your medication list with you and share with your provider. Update the information when medications are discontinued, doses are changed, or new medications (including over-the-counter products) are added; and carry medication information at all times in the event of emergency situations     Allergies:  IODINE - (reactions not documented)     TAPE - Rash               Medications  Valid as of: March 24, 2017 -  8:16 AM    Generic Name Brand Name Tablet Size Instructions for use    Clindamycin Phosphate (Gel) CLEOCIN T 1 % 1 application twice daily to affected areas        Diclofenac Sodium (Tablet Delayed Response) VOLTAREN 75 MG TAKE 1 TABLET BY MOUTH TWICE DAILY        Doxycycline Hyclate (Tab) VIBRAMYCIN 100 MG Take 1 Tab by mouth 2 times a day.        DULoxetine HCl (Cap DR Particles) CYMBALTA 60 MG Take 1 Cap by mouth every day.        HydroCHLOROthiazide (Cap) MICROZIDE  12.5 MG TAKE 1 CAPSULE BY MOUTH EVERY DAY        Levothyroxine Sodium (Tab) SYNTHROID 125 MCG Take 1 Tab by mouth Every morning on an empty stomach.        Lorcaserin HCl (Tab) BELVIQ 10 MG Take 1 Tab by mouth 2 Times a Day.        PredniSONE (Tab) DELTASONE 10 MG Take 40 mg for 3 days, then 30 mg for 3 days, then 20 mg for 3 days, then 10 mg for 3 days        Solifenacin Succinate (Tab) VESICARE 10 MG Take 1 Tab by mouth every day.        Sulfamethoxazole-Trimethoprim (Tab) BACTRIM -160 MG Take 1 Tab by mouth 2 times a day.        Suvorexant (Tab) Suvorexant 15 MG Take 15 mg by mouth at bedtime as needed (for insomnia).        ValACYclovir HCl (Tab) VALTREX 1 GM TAKE 1 TABLET BY MOUTH EVERY MORNING        .                 Medicines prescribed today were sent to:     [facility] [address] [place] [address], [place] - [address] [place] [address] & [place]    [address] [facility] [address]    Phone: [phone] Fax: [phone]    Open 24 Hours?: No      Medication refill instructions:       If your prescription bottle indicates you have medication refills left, it is not necessary to call your provider’s office. Please contact your pharmacy and they will refill your medication.    If your prescription bottle indicates you do not have any refills left, you may request refills at any time through one of the following ways: The online [employer] system (except Urgent Care), by calling your provider’s office, or by asking your pharmacy to contact your provider’s office with a refill request. Medication refills are processed only during regular business hours and may not be available until the next business day. Your provider may request additional information or to have a follow-up visit with you prior to refilling your medication.   *Please Note: Medication refills are assigned a new Rx number when refilled electronically. Your pharmacy may indicate that no refills were authorized even though a  new prescription for the same medication is available at the pharmacy. Please request the medicine by name with the pharmacy before contacting your provider for a refill.           MyChart Access Code: Activation code not generated  Current [employer] Status: Active

## 2017-03-27 NOTE — TELEPHONE ENCOUNTER
Was the patient seen in the last year in this department? Yes 03/24/2017    Does patient have an active prescription for medications requested? No     Received Request Via: Pharmacy

## 2017-03-28 RX ORDER — DICLOFENAC SODIUM 75 MG/1
TABLET, DELAYED RELEASE ORAL
Qty: 180 TAB | Refills: 0 | Status: SHIPPED | OUTPATIENT
Start: 2017-03-28 | End: 2017-06-08 | Stop reason: SDUPTHER

## 2017-03-28 NOTE — TELEPHONE ENCOUNTER
Was the patient seen in the last year in this department? Yes     Does patient have an active prescription for medications requested? No     Received Request Via: Pharmacy      Pt met protocol?: Yes, last ov 3/24/17

## 2017-03-29 DIAGNOSIS — G47.33 OBSTRUCTIVE SLEEP APNEA: ICD-10-CM

## 2017-03-29 DIAGNOSIS — G47.00 INSOMNIA, UNSPECIFIED TYPE: ICD-10-CM

## 2017-03-30 RX ORDER — SUVOREXANT 15 MG/1
TABLET, FILM COATED ORAL
Qty: 30 TAB | Refills: 2 | Status: SHIPPED
Start: 2017-03-30 | End: 2017-05-18

## 2017-03-30 NOTE — TELEPHONE ENCOUNTER
Have we ever prescribed this med? Yes.  If yes, what date? 10/21/2016    Last OV: 10/21/2016    Next OV: 05/18/2017    DX: Insomnia     Medications:   Requested Prescriptions     Pending Prescriptions Disp Refills   • BELSOMRA 15 MG Tab [Pharmacy Med Name: BELSOMRA 15MG TABLETS] 30 Tab 2     Sig: TAKE 1 TABLET BY MOUTH AT BEDTIME AS NEEDED FOR INSOMNIA

## 2017-04-07 RX ORDER — HYDROCHLOROTHIAZIDE 12.5 MG/1
CAPSULE, GELATIN COATED ORAL
Qty: 90 CAP | Refills: 0 | Status: SHIPPED | OUTPATIENT
Start: 2017-04-07 | End: 2017-06-29 | Stop reason: SDUPTHER

## 2017-04-07 NOTE — TELEPHONE ENCOUNTER
Was the patient seen in the last year in this department? Yes     Does patient have an active prescription for medications requested? No     Received Request Via: Pharmacy      Pt met protocol?: Yes, OV last month   BP Readings from Last 1 Encounters:   03/24/17 120/78

## 2017-04-12 ENCOUNTER — PATIENT MESSAGE (OUTPATIENT)
Dept: MEDICAL GROUP | Facility: PHYSICIAN GROUP | Age: 52
End: 2017-04-12

## 2017-04-12 NOTE — TELEPHONE ENCOUNTER
Was the patient seen in the last year in this department? Yes     Does patient have an active prescription for medications requested? No     Received Request Via: Pharmacy      Pt met protocol?: Yes    ** should pt be on the 30mg still? We sent the 60mg on 3/24

## 2017-04-13 RX ORDER — DULOXETIN HYDROCHLORIDE 30 MG/1
CAPSULE, DELAYED RELEASE ORAL
Refills: 0 | OUTPATIENT
Start: 2017-04-13

## 2017-04-14 ENCOUNTER — TELEPHONE (OUTPATIENT)
Dept: MEDICAL GROUP | Facility: PHYSICIAN GROUP | Age: 52
End: 2017-04-14

## 2017-04-14 ENCOUNTER — HOSPITAL ENCOUNTER (OUTPATIENT)
Dept: LAB | Facility: MEDICAL CENTER | Age: 52
End: 2017-04-14
Attending: FAMILY MEDICINE
Payer: COMMERCIAL

## 2017-04-14 DIAGNOSIS — R21 MALAR RASH: ICD-10-CM

## 2017-04-14 DIAGNOSIS — R76.8 POSITIVE ANA (ANTINUCLEAR ANTIBODY): ICD-10-CM

## 2017-04-14 DIAGNOSIS — M25.50 POLYARTHRALGIA: ICD-10-CM

## 2017-04-14 PROCEDURE — 36415 COLL VENOUS BLD VENIPUNCTURE: CPT

## 2017-04-14 PROCEDURE — 86038 ANTINUCLEAR ANTIBODIES: CPT

## 2017-04-14 NOTE — TELEPHONE ENCOUNTER
Please ask Polly to get some additional labs,  I have discussed her case with Dr. Salter who will see her for consultation.

## 2017-04-14 NOTE — TELEPHONE ENCOUNTER
Patient notified and she will be getting labs done. She also wants to know if she needs a referral to Dr. Salter.

## 2017-04-18 LAB
NUCLEAR IGG SER QL IA: DETECTED
NUCLEAR IGG TITR SER IF: ABNORMAL {TITER}

## 2017-04-27 ENCOUNTER — HOSPITAL ENCOUNTER (OUTPATIENT)
Dept: LAB | Facility: MEDICAL CENTER | Age: 52
End: 2017-04-27
Attending: FAMILY MEDICINE
Payer: COMMERCIAL

## 2017-04-27 ENCOUNTER — OFFICE VISIT (OUTPATIENT)
Dept: MEDICAL GROUP | Facility: PHYSICIAN GROUP | Age: 52
End: 2017-04-27
Payer: COMMERCIAL

## 2017-04-27 VITALS
RESPIRATION RATE: 18 BRPM | OXYGEN SATURATION: 97 % | SYSTOLIC BLOOD PRESSURE: 138 MMHG | DIASTOLIC BLOOD PRESSURE: 80 MMHG | HEART RATE: 76 BPM | WEIGHT: 293 LBS | BODY MASS INDEX: 50.02 KG/M2 | HEIGHT: 64 IN | TEMPERATURE: 96.3 F

## 2017-04-27 DIAGNOSIS — D71 CHRONIC GRANULOMATOUS DISEASE (HCC): ICD-10-CM

## 2017-04-27 DIAGNOSIS — R76.8 POSITIVE ANA (ANTINUCLEAR ANTIBODY): ICD-10-CM

## 2017-04-27 PROCEDURE — 36415 COLL VENOUS BLD VENIPUNCTURE: CPT

## 2017-04-27 PROCEDURE — 86160 COMPLEMENT ANTIGEN: CPT

## 2017-04-27 PROCEDURE — 86039 ANTINUCLEAR ANTIBODIES (ANA): CPT

## 2017-04-27 PROCEDURE — 86225 DNA ANTIBODY NATIVE: CPT

## 2017-04-27 PROCEDURE — 86038 ANTINUCLEAR ANTIBODIES: CPT

## 2017-04-27 PROCEDURE — 86431 RHEUMATOID FACTOR QUANT: CPT

## 2017-04-27 PROCEDURE — 86376 MICROSOMAL ANTIBODY EACH: CPT

## 2017-04-27 PROCEDURE — 99213 OFFICE O/P EST LOW 20 MIN: CPT | Performed by: FAMILY MEDICINE

## 2017-04-27 PROCEDURE — 86235 NUCLEAR ANTIGEN ANTIBODY: CPT | Mod: 91

## 2017-04-27 NOTE — Clinical Note
[facility]  Dorys Young M.D.  [address]  [facility] [address]  Fax: [phone]   Authorization for Release/Disclosure of   Protected Health Information   Name: POLLY HOFFMAN : 1965 SSN: [id number]   Address: [address] [place] [address] Phone:    [phone] (home)    I authorize the entity listed below to release/disclose the PHI below to:   [facility]/Dorys Young M.D. and Dorys Young M.D.   Provider or Entity Name:[facility]     Address   City, State, Zip   Phone:      Fax:     Reason for request: continuity of care   Information to be released:    [  ] LAST COLONOSCOPY,  including any PATH REPORT and follow-up  [  ] LAST FIT/COLOGUARD RESULT [  ] LAST DEXA  [  ] LAST MAMMOGRAM  [  ] LAST PAP  [  ] LAST LABS [  ] RETINA EXAM REPORT  [  ] IMMUNIZATION RECORDS  [  ] Release all info  MRI Report (17)          DATES OF SERVICE OR TIME PERIOD TO BE DISCLOSED: _____________  I understand and acknowledge that:  * This Authorization may be revoked at any time by you in writing, except if your health information has already been used or disclosed.  * Your health information that will be used or disclosed as a result of you signing this authorization could be re-disclosed by the recipient. If this occurs, your re-disclosed health information may no longer be protected by State or Federal laws.  * You may refuse to sign this Authorization. Your refusal will not affect your ability to obtain treatment.  * This Authorization becomes effective upon signing and will  on (date) __________.      If no date is indicated, this Authorization will  one (1) year from the signature date.    Name: Polly Hoffman       Date:     2017       PLEASE FAX REQUESTED RECORDS BACK TO: [phone]

## 2017-04-27 NOTE — MR AVS SNAPSHOT
"        Polly Menchaca   2017 7:00 AM   Office Visit   MRN: [id number]    Department:  [facility]   Dept Phone:  [phone]    Description:  Female : 1965   Provider:  Dorys Young M.D.           Reason for Visit     Follow-Up           Allergies as of 2017     Allergen Noted Reactions    Iodine 2010       IV iodine causes syncope    Tape 2010   Rash    SILK; may use paper tape      You were diagnosed with     Positive JETT (antinuclear antibody)   [[id number]]         Vital Signs     Blood Pressure Pulse Temperature Respirations Height Weight    138/80 mmHg 76 35.7 °C (96.3 °F) 18 1.626 m (5' 4.02\") 139.254 kg (307 lb)    Body Mass Index Oxygen Saturation Smoking Status             52.67 kg/m2 97% Never Smoker          Basic Information     Date Of Birth Sex Race Ethnicity Preferred Language    1965 Female White Non- English      Your appointments     May 18, 2017  8:40 AM   Follow UP with DORIS Roach   [facility] (--)    [address] [place] [id number] [phone]            May 26, 2017  7:00 AM   Established Patient with Dorys Young M.D.   [address] [id number] [phone]           You will be receiving a confirmation call a few days before your appointment from our automated call confirmation system.            2017  7:00 AM   Established Patient with Dorys Young M.D.   [address] [id number] [phone]           You will be receiving a confirmation call a few days before your appointment from our automated call confirmation system.              Problem List              ICD-10-CM Priority Class Noted - Resolved    Other specified symptom associated with female genital organs N94.89   Unknown - Present    Stress incontinence N39.3   " 5/27/2014 - Present    Osteoarthritis M19.90   9/12/2014 - Present    Hypothyroidism E03.9   9/12/2014 - Present    Insomnia G47.00   9/12/2014 - Present    JOSE on CPAP G47.33   9/12/2014 - Present    HTN (hypertension) I10   9/12/2014 - Present    Chronic granulomatous disease (HCC) D71   9/12/2014 - Present    History of cold sores Z86.19   5/2/2016 - Present    Hypersomnia G47.10   5/6/2016 - Present    Adult BMI 50.0-59.9 kg/sq m (CMS-HCC) Z68.43   1/12/2017 - Present      Health Maintenance        Date Due Completion Dates    COLON CANCER SCREENING ANNUAL FIT 12/25/2017 12/25/2016    MAMMOGRAM 1/12/2018 1/12/2017, 5/1/2015, 9/25/2014    IMM DTaP/Tdap/Td Vaccine (2 - Td) 8/20/2025 8/20/2015            Current Immunizations     Influenza Vaccine Quad Inj (Preserved) 11/3/2016    Tdap Vaccine 8/20/2015      Below and/or attached are the medications your provider expects you to take. Review all of your home medications and newly ordered medications with your provider and/or pharmacist. Follow medication instructions as directed by your provider and/or pharmacist. Please keep your medication list with you and share with your provider. Update the information when medications are discontinued, doses are changed, or new medications (including over-the-counter products) are added; and carry medication information at all times in the event of emergency situations     Allergies:  IODINE - (reactions not documented)     TAPE - Rash               Medications  Valid as of: April 27, 2017 -  7:23 AM    Generic Name Brand Name Tablet Size Instructions for use    Diclofenac Sodium (Tablet Delayed Response) VOLTAREN 75 MG TAKE 1 TABLET BY MOUTH TWICE DAILY        DULoxetine HCl (Cap DR Particles) CYMBALTA 60 MG Take 1 Cap by mouth every day.        Levothyroxine Sodium (Tab) SYNTHROID 125 MCG Take 1 Tab by mouth Every morning on an empty stomach.        Lorcaserin HCl (Tab) BELVIQ 10 MG Take 1 Tab by mouth 2 Times a Day.         Suvorexant (Tab) BELSOMRA 15 MG TAKE 1 TABLET BY MOUTH AT BEDTIME AS NEEDED FOR INSOMNIA        ValACYclovir HCl (Tab) VALTREX 1 GM TAKE 1 TABLET BY MOUTH EVERY MORNING        .                 Medicines prescribed today were sent to:     [id number] - [place], [place] - [address] [facility] [address] [facility] [address]    [address] [place] [address]    Phone: [phone] Fax: [phone]    Open 24 Hours?: No      Medication refill instructions:       If your prescription bottle indicates you have medication refills left, it is not necessary to call your provider’s office. Please contact your pharmacy and they will refill your medication.    If your prescription bottle indicates you do not have any refills left, you may request refills at any time through one of the following ways: The online [employer] system (except Urgent Care), by calling your provider’s office, or by asking your pharmacy to contact your provider’s office with a refill request. Medication refills are processed only during regular business hours and may not be available until the next business day. Your provider may request additional information or to have a follow-up visit with you prior to refilling your medication.   *Please Note: Medication refills are assigned a new Rx number when refilled electronically. Your pharmacy may indicate that no refills were authorized even though a new prescription for the same medication is available at the pharmacy. Please request the medicine by name with the pharmacy before contacting your provider for a refill.           [employer] Access Code: Activation code not generated  Current [employer] Status: Active

## 2017-04-30 LAB — THYROPEROXIDASE AB SERPL-ACNC: 2.7 IU/ML (ref 0–9)

## 2017-05-01 LAB
C3 SERPL-MCNC: 156 MG/DL (ref 88–201)
C4 SERPL-MCNC: 36 MG/DL (ref 10–40)
DSDNA AB TITR SER CLIF: NORMAL {TITER}
NUCLEAR IGG SER QL IA: DETECTED
NUCLEAR IGG TITR SER IF: ABNORMAL {TITER}
RHEUMATOID FACT SER NEPH-ACNC: <10 IU/ML (ref 0–14)

## 2017-05-02 LAB
ENA SCL70 IGG SER QL: 1 AU/ML (ref 0–40)
ENA SM IGG SER-ACNC: 1 AU/ML (ref 0–40)
ENA SS-B IGG SER IA-ACNC: 3 AU/ML (ref 0–40)
SSA52 R0ENA AB IGG Q0420: 6 AU/ML (ref 0–40)
SSA60 R0ENA AB IGG Q0419: 9 AU/ML (ref 0–40)
U1 SNRNP IGG SER QL: 2 AU/ML (ref 0–40)

## 2017-05-18 ENCOUNTER — SLEEP CENTER VISIT (OUTPATIENT)
Dept: SLEEP MEDICINE | Facility: MEDICAL CENTER | Age: 52
End: 2017-05-18
Payer: COMMERCIAL

## 2017-05-18 VITALS
OXYGEN SATURATION: 98 % | RESPIRATION RATE: 16 BRPM | WEIGHT: 293 LBS | HEIGHT: 64 IN | TEMPERATURE: 98.6 F | BODY MASS INDEX: 50.02 KG/M2 | HEART RATE: 75 BPM | SYSTOLIC BLOOD PRESSURE: 128 MMHG | DIASTOLIC BLOOD PRESSURE: 82 MMHG

## 2017-05-18 DIAGNOSIS — G47.10 HYPERSOMNIA: ICD-10-CM

## 2017-05-18 DIAGNOSIS — G47.33 OSA ON CPAP: ICD-10-CM

## 2017-05-18 DIAGNOSIS — G47.00 INSOMNIA, UNSPECIFIED TYPE: ICD-10-CM

## 2017-05-18 PROCEDURE — 99213 OFFICE O/P EST LOW 20 MIN: CPT | Performed by: NURSE PRACTITIONER

## 2017-05-18 RX ORDER — PREDNISONE 10 MG/1
TABLET ORAL
Refills: 0 | COMMUNITY
Start: 2017-03-24 | End: 2017-05-01

## 2017-05-18 RX ORDER — HYDROCODONE BITARTRATE AND ACETAMINOPHEN 5; 325 MG/1; MG/1
TABLET ORAL
Refills: 0 | COMMUNITY
Start: 2017-04-18 | End: 2017-05-01

## 2017-05-18 RX ORDER — CYCLOBENZAPRINE HCL 10 MG
TABLET ORAL
Refills: 0 | COMMUNITY
Start: 2017-04-18 | End: 2017-05-01

## 2017-05-18 RX ORDER — SULFAMETHOXAZOLE AND TRIMETHOPRIM 800; 160 MG/1; MG/1
TABLET ORAL
Refills: 0 | COMMUNITY
Start: 2017-02-23 | End: 2017-05-01

## 2017-05-18 RX ORDER — DULOXETIN HYDROCHLORIDE 30 MG/1
CAPSULE, DELAYED RELEASE ORAL
Refills: 0 | COMMUNITY
Start: 2017-03-20 | End: 2017-05-01

## 2017-05-18 RX ORDER — HYDROCHLOROTHIAZIDE 12.5 MG/1
CAPSULE, GELATIN COATED ORAL
Refills: 0 | COMMUNITY
Start: 2017-05-06 | End: 2017-05-10

## 2017-05-18 NOTE — PATIENT INSTRUCTIONS
Plan:    1) Continue CPAP at 9 CM H20. Order for dream wear mask to her DME.   2) Sleep hygiene discussed. Increase Belsomra to 20 mg 1 po qhs prn insomnia. Samples and RX provided.  3) She does have joint pain that effects her sleep. She states she is pending an appointment with Rheumatology to eval for possible Lupus.   4) Weight loss recommended.  5) Follow up in 6 months, sooner if needed.

## 2017-05-18 NOTE — PROGRESS NOTES
Chief Complaint   Patient presents with   • Apnea     CPAP 9       HPI:  Polly Menchaca is a 51 y.o. year old female here today for follow-up on her obstructive sleep apnea. PSG indicated an AHI of 12.6 with a REM index of 42.4 with a low 02 of 78%. She was titrated to a CPAP pressure of 9 CM with a resultant AHI of 0.4 with a low 02 of 90%. Compliance card download today in the office indicates an AHI of 2.1 with an average use of 8.5 hours at night. She had been on Ambien 10 mg qhs to help with insomnia, but felt it was not longer working for her.  She was switched to Belsomra 15 mg which she feels works, but she feels she needs a little higher dose. She does have joint pain which effects her sleep at times. She was also switched to a nasal mask which she feels is comfortable. She tolerates the pressure well. She does feel she sleeps better and wakes more refreshed on therapy. She denies any morning headaches.        Past Medical History   Diagnosis Date   • Unspecified urinary incontinence    • Snoring    • Other specified symptom associated with female genital organs      Unspecified STD   • Urinary bladder disorder    • Unspecified disorder of thyroid    • Sleep apnea      Uses CPAP   • Arthritis 11-11-10     bilateral hands; L knee   • Anesthesia      Mother was difficult to awaken after anesthesia   • Dental disorder 1984     Mandible surgery   • Pain 05/21/14     abdomen where mesh is currently=2/10   • Obstructive sleep apnea        Past Surgical History   Procedure Laterality Date   • Other surgical procedure  1984     jaw   • Tonsillectomy  1993   • Laparoscopy  1998   • Bladder suspension  2009   • Other orthopedic surgery  2003     left wrist-   • Knee arthroscopy  2008     Arthroscopy, Knee, left   • Ligament reconstruction  11/15/2010     Performed by QUINTIN HAYWARD at SURGERY SAME DAY [facility]   • Finger arthroplasty  11/15/2010     Performed by QUINTIN HAYWARD at SURGERY SAME DAY  [facility]   • Cholecystectomy     • Other  ,,     Bladder repair with mesh   • Primary c section          • Vaginal hysterectomy total       Hysterectomy,Total Vaginal   • Bladder sling female  2014     Performed by Cl De La Torre M.D. at SURGERY SAME DAY [facility]       Family History   Problem Relation Age of Onset   • Diabetes     • Heart Disease     • Hypertension     • Stroke     • Cancer     • Cancer Sister    • Cancer Mother    • Diabetes Father    • Heart Disease Father        Social History     Social History   • Marital Status:      Spouse Name: N/A   • Number of Children: N/A   • Years of Education: N/A     Occupational History   • Not on file.     Social History Main Topics   • Smoking status: Never Smoker    • Smokeless tobacco: Never Used   • Alcohol Use: No   • Drug Use: No   • Sexual Activity: Not on file     Other Topics Concern   • Not on file     Social History Narrative       ROS:  Constitutional: Denies fevers, chills, sweats, fatigue, weight loss  Eyes: Denies vision loss, pain, drainage, double vision. Wears glasses  Ears/Nose/Mouth/Throat: Denies rhinitis, nasal congestion, ear ache, difficulty hearing, sore throat, persistent hoarseness, decayed teeth/toothache  Cardiovascular: Denies chest pain, tightness, palpitations, swelling in feet/legs, fainting, difficulty breathing when laying down  Respiratory: Denies shortness of breath, cough, sputum, wheezing, painful breathing, coughing up blood  GI: Denies heartburn, difficulty swallowing, nausea, vomiting, abdominal pain, diarrhea, constipation  : Denies frequent urination, painful urination  Integumentary: Denies rashes, lumps or color changes  MSK: Positive hip, knee and shoulder pain   Neurological: Denies frequent headaches, dizziness, weakness  Sleep: See HPI       Current Outpatient Prescriptions on File Prior to Visit   Medication Sig Dispense Refill   • BELSOMRA 15 MG Tab  "TAKE 1 TABLET BY MOUTH AT BEDTIME AS NEEDED FOR INSOMNIA 30 Tab 2   • diclofenac EC (VOLTAREN) 75 MG Tablet Delayed Response TAKE 1 TABLET BY MOUTH TWICE DAILY 180 Tab 0   • duloxetine (CYMBALTA) 60 MG Cap DR Particles delayed-release capsule Take 1 Cap by mouth every day. 90 Cap 3   • valacyclovir (VALTREX) 1 GM Tab TAKE 1 TABLET BY MOUTH EVERY MORNING 90 Tab 0   • BELVIQ 10 MG Tab Take 1 Tab by mouth 2 Times a Day. 60 Tab 1   • SYNTHROID 125 MCG Tab Take 1 Tab by mouth Every morning on an empty stomach. 90 Tab 3     No current facility-administered medications on file prior to visit.     Iodine and Tape    Blood pressure 128/82, pulse 75, temperature 37 °C (98.6 °F), resp. rate 16, height 1.626 m (5' 4\"), weight 133.811 kg (295 lb), SpO2 98 %.  PE:   Appearance: Well developed, well nourished, no acute distress  Eyes: PERRL, EOM intact, sclera white, conjunctiva moist  Ears: no lesions or deformities  Hearing: grossly intact  Nose: no lesions or deformities  Oropharynx: tongue normal, posterior pharynx without erythema or exudate  Mallampati Classification: class 4  Neck: supple, trachea midline, no masses   Respiratory effort: no intercostal retractions or use of accessory muscles  Lung auscultation: no rales, rhonchi or wheezes  Heart auscultation: no murmur rub or gallop  Extremities: no cyanosis or edema  Abdomen: soft ,non tender, no masses  Gait and Station: normal  Digits and nails: no clubbing, cyanosis, petechiae or nodes.  Cranial nerves: grossly intact  Skin: no rashes, lesions or ulcers noted  Orientation: Oriented to time, person and place  Mood and affect: mood and affect appropriate, normal interaction with examiner  Judgement: Intact          Assessment:  1. JOSE on CPAP     2. Hypersomnia     3. Insomnia, unspecified type           Plan:    1) Continue CPAP at 9 CM H20. Order for dream wear mask to her DME.   2) Sleep hygiene discussed. Increase Belsomra to 20 mg 1 po qhs prn insomnia. Samples and " RX provided.  3) She does have joint pain that effects her sleep. She states she is pending an appointment with Rheumatology to eval for possible Lupus.   4) Weight loss recommended.  5) Follow up in 6 months, sooner if needed.

## 2017-05-26 ENCOUNTER — HOSPITAL ENCOUNTER (OUTPATIENT)
Dept: LAB | Facility: MEDICAL CENTER | Age: 52
End: 2017-05-26
Attending: FAMILY MEDICINE
Payer: COMMERCIAL

## 2017-05-26 ENCOUNTER — OFFICE VISIT (OUTPATIENT)
Dept: MEDICAL GROUP | Facility: PHYSICIAN GROUP | Age: 52
End: 2017-05-26
Payer: COMMERCIAL

## 2017-05-26 VITALS
TEMPERATURE: 97.6 F | SYSTOLIC BLOOD PRESSURE: 120 MMHG | DIASTOLIC BLOOD PRESSURE: 66 MMHG | WEIGHT: 293 LBS | BODY MASS INDEX: 50.02 KG/M2 | HEART RATE: 86 BPM | RESPIRATION RATE: 18 BRPM | HEIGHT: 64 IN | OXYGEN SATURATION: 96 %

## 2017-05-26 DIAGNOSIS — E03.9 ACQUIRED HYPOTHYROIDISM: ICD-10-CM

## 2017-05-26 DIAGNOSIS — E66.01 OBESITY, MORBID, BMI 50 OR HIGHER (HCC): ICD-10-CM

## 2017-05-26 DIAGNOSIS — G47.33 OSA ON CPAP: ICD-10-CM

## 2017-05-26 DIAGNOSIS — M25.50 POLYARTHRALGIA: ICD-10-CM

## 2017-05-26 DIAGNOSIS — L70.9 ACNE, UNSPECIFIED ACNE TYPE: ICD-10-CM

## 2017-05-26 DIAGNOSIS — G47.00 INSOMNIA, UNSPECIFIED TYPE: ICD-10-CM

## 2017-05-26 DIAGNOSIS — R76.8 POSITIVE ANA (ANTINUCLEAR ANTIBODY): ICD-10-CM

## 2017-05-26 DIAGNOSIS — D71 CHRONIC GRANULOMATOUS DISEASE (HCC): ICD-10-CM

## 2017-05-26 LAB
T4 FREE SERPL-MCNC: 1.35 NG/DL (ref 0.53–1.43)
TSH SERPL DL<=0.005 MIU/L-ACNC: 0.14 UIU/ML (ref 0.3–3.7)

## 2017-05-26 PROCEDURE — 36415 COLL VENOUS BLD VENIPUNCTURE: CPT

## 2017-05-26 PROCEDURE — 84443 ASSAY THYROID STIM HORMONE: CPT

## 2017-05-26 PROCEDURE — 99214 OFFICE O/P EST MOD 30 MIN: CPT | Performed by: FAMILY MEDICINE

## 2017-05-26 PROCEDURE — 84439 ASSAY OF FREE THYROXINE: CPT

## 2017-05-26 RX ORDER — DULOXETIN HYDROCHLORIDE 60 MG/1
60 CAPSULE, DELAYED RELEASE ORAL
Qty: 90 CAP | Refills: 3 | Status: SHIPPED | OUTPATIENT
Start: 2017-05-26 | End: 2018-06-14 | Stop reason: SDUPTHER

## 2017-05-26 RX ORDER — DOXYCYCLINE HYCLATE 100 MG
100 TABLET ORAL 2 TIMES DAILY
Qty: 28 TAB | Refills: 0 | Status: SHIPPED | OUTPATIENT
Start: 2017-05-26 | End: 2017-06-09

## 2017-05-26 NOTE — MR AVS SNAPSHOT
"        Polly Isabel Nikolai   2017 7:00 AM   Office Visit   MRN: [id number]    Department:  [facility]   Dept Phone:  [phone]    Description:  Female : 1965   Provider:  Dorys Young M.D.           Reason for Visit     Medication Refill           Allergies as of 2017     Allergen Noted Reactions    Iodine 2010       IV iodine causes syncope    Tape 2010   Rash    SILK; may use paper tape      You were diagnosed with     Obesity, morbid, BMI 50 or higher (CMS-HCC)   [183451]       Acne, unspecified acne type   [9147497]       Acquired hypothyroidism   [6857947]       JOSE on CPAP   [876435]       Insomnia, unspecified type   [7568630]       Polyarthralgia   [716132]       Positive JETT (antinuclear antibody)   [513992]       Chronic granulomatous disease (CMS-HCC)   [092898]         Vital Signs     Blood Pressure Pulse Temperature Respirations Height Weight    120/66 mmHg 86 36.4 °C (97.6 °F) 18 1.626 m (5' 4.02\") 134.265 kg (296 lb)    Body Mass Index Oxygen Saturation Smoking Status             50.78 kg/m2 96% Never Smoker          Basic Information     Date Of Birth Sex Race Ethnicity Preferred Language    1965 Female White Non- English      Your appointments     2017  7:00 AM   Established Patient with Dorys Young M.D.   [address] [id number] [phone]           You will be receiving a confirmation call a few days before your appointment from our automated call confirmation system.            2017  7:20 AM   Established Patient with Dorys Young M.D.   [address] [phone] [phone]           You will be receiving a confirmation call a few days before your appointment from our automated call confirmation system.            2017  8:00 AM   Follow UP with Shruti Vallecillo, " QUINTON   [facility] (--)    [address]  [place] [id number] [phone]              Problem List              ICD-10-CM Priority Class Noted - Resolved    Other specified symptom associated with female genital organs N94.89   Unknown - Present    Stress incontinence N39.3   5/27/2014 - Present    Osteoarthritis M19.90   9/12/2014 - Present    Hypothyroidism E03.9   9/12/2014 - Present    Insomnia G47.00   9/12/2014 - Present    JOSE on CPAP G47.33, Z99.89   9/12/2014 - Present    HTN (hypertension) I10   9/12/2014 - Present    Chronic granulomatous disease (HCC) D71   9/12/2014 - Present    History of cold sores Z86.19   5/2/2016 - Present    Hypersomnia G47.10   5/6/2016 - Present    Adult BMI 50.0-59.9 kg/sq m (CMS-HCC) Z68.43   1/12/2017 - Present    Positive JETT (antinuclear antibody) R76.8   5/26/2017 - Present      Health Maintenance        Date Due Completion Dates    COLON CANCER SCREENING ANNUAL FIT 12/25/2017 12/25/2016    MAMMOGRAM 1/12/2018 1/12/2017, 5/1/2015, 9/25/2014    IMM DTaP/Tdap/Td Vaccine (2 - Td) 8/20/2025 8/20/2015            Current Immunizations     Influenza Vaccine Quad Inj (Preserved) 11/3/2016    Tdap Vaccine 8/20/2015      Below and/or attached are the medications your provider expects you to take. Review all of your home medications and newly ordered medications with your provider and/or pharmacist. Follow medication instructions as directed by your provider and/or pharmacist. Please keep your medication list with you and share with your provider. Update the information when medications are discontinued, doses are changed, or new medications (including over-the-counter products) are added; and carry medication information at all times in the event of emergency situations     Allergies:  IODINE - (reactions not documented)     TAPE - Rash               Medications  Valid as of: May 26, 2017 -  7:21 AM    Generic Name Brand Name Tablet  Size Instructions for use    Diclofenac Sodium (Tablet Delayed Response) VOLTAREN 75 MG TAKE 1 TABLET BY MOUTH TWICE DAILY        Doxycycline Hyclate (Tab) VIBRAMYCIN 100 MG Take 1 Tab by mouth 2 times a day for 14 days.        DULoxetine HCl (Cap DR Particles) CYMBALTA 60 MG Take 1 Cap by mouth every day.        Levothyroxine Sodium (Tab) SYNTHROID 125 MCG Take 1 Tab by mouth Every morning on an empty stomach.        Lorcaserin HCl (Tab) BELVIQ 10 MG Take 1 Tab by mouth 2 Times a Day.        Suvorexant (Tab) Suvorexant 20 MG Take 1 Tab by mouth at bedtime as needed (for insomnia). Take 1 tablet by mouth at bedtime as needed for insomnia.        ValACYclovir HCl (Tab) VALTREX 1 GM TAKE 1 TABLET BY MOUTH EVERY MORNING        .                 Medicines prescribed today were sent to:     [facility] [address], [place] - [address] [address] [facility] [address] [place] [address] & [place]    [address] [facility] [address] [facility] [address]    Phone: [phone] Fax: [phone]    Open 24 Hours?: No      Medication refill instructions:       If your prescription bottle indicates you have medication refills left, it is not necessary to call your provider’s office. Please contact your pharmacy and they will refill your medication.    If your prescription bottle indicates you do not have any refills left, you may request refills at any time through one of the following ways: The online [employer] system (except Urgent Care), by calling your provider’s office, or by asking your pharmacy to contact your provider’s office with a refill request. Medication refills are processed only during regular business hours and may not be available until the next business day. Your provider may request additional information or to have a follow-up visit with you prior to refilling your medication.   *Please Note: Medication refills are assigned a new Rx number when refilled electronically. Your pharmacy may indicate that no refills were authorized  even though a new prescription for the same medication is available at the pharmacy. Please request the medicine by name with the pharmacy before contacting your provider for a refill.        Your To Do List     Future Labs/Procedures Complete By Expires    TSH WITH REFLEX TO FT4  As directed 5/26/2018         [employer] Access Code: Activation code not generated  Current [email or web address] Status: Active

## 2017-05-26 NOTE — PROGRESS NOTES
Chief Complaint   Patient presents with   • Medication Refill       HISTORY OF PRESENT ILLNESS: Patient is a 51 y.o. female established patient here today for the following concerns:      Here today for follow up:  1. Obesity, morbid, BMI 50 or higher (CMS-[facility])  Reports that since being off steroids has had improvement in weight loss again.  She is taking the belviq with good results.      2. Acne, unspecified acne type  Reports increase in acne lesions on the face, chest, and back.  She has hx of having soft tissue infections easily.     3. Acquired hypothyroidism  Continues on synthroid 125 mcg daily.  Feeling well.  No hair or skin changes other than that mentioned above.   Has had some increase in hot flashes.     4. JOSE on CPAP  5. Insomnia, unspecified type    Continues on CPAP therapy.  Using the belsomra for sleep aid without additional side effects.  Needs refill.       6. Polyarthralgia  7. Positive JETT (antinuclear antibody)  8. Chronic granulomatous disease ([facility])  Has initial consultation with rheum 8/1.  Reports still getting some joint pains and malar rash with sun exposure.  JETT titer has continued to increase non-specific pattern, CCP antibodies were mildly positive previously.  Remainder of labs have been normal.     Past Medical, Social, and Family history reviewed and updated in EPIC    Allergies:Iodine and Tape    Current Outpatient Prescriptions   Medication Sig Dispense Refill   • BELVIQ 10 MG Tab Take 1 Tab by mouth 2 Times a Day. 60 Tab 2   • doxycycline (VIBRAMYCIN) 100 MG Tab Take 1 Tab by mouth 2 times a day for 14 days. 28 Tab 0   • Suvorexant (BELSOMRA) 20 MG Tab Take 1 Tab by mouth at bedtime as needed (for insomnia). Take 1 tablet by mouth at bedtime as needed for insomnia. 30 Tab 3   • duloxetine (CYMBALTA) 60 MG Cap DR Particles delayed-release capsule Take 1 Cap by mouth every day. 90 Cap 3   • diclofenac EC (VOLTAREN) 75 MG Tablet Delayed Response TAKE 1 TABLET BY MOUTH TWICE DAILY  "180 Tab 0   • valacyclovir (VALTREX) 1 GM Tab TAKE 1 TABLET BY MOUTH EVERY MORNING 90 Tab 0   • SYNTHROID 125 MCG Tab Take 1 Tab by mouth Every morning on an empty stomach. 90 Tab 3     No current facility-administered medications for this visit.         ROS:  Review of Systems   Constitutional: Negative for fever, chills, weight loss and malaise/fatigue.   HENT: Negative for ear pain, nosebleeds, congestion, sore throat and neck pain.    Eyes: Negative for blurred vision.   Respiratory: Negative for cough, sputum production, shortness of breath and wheezing.    Cardiovascular: Negative for chest pain, palpitations,  and leg swelling.   Gastrointestinal: Negative for heartburn, nausea, vomiting, diarrhea and abdominal pain.   Genitourinary: Negative for dysuria, urgency and frequency.   Musculoskeletal: Negative for myalgias, back pain and joint pain.   Skin: Negative for rash and itching.   Neurological: Negative for dizziness, tingling, tremors, sensory change, focal weakness and headaches.   Endo/Heme/Allergies: Does not bruise/bleed easily.   Psychiatric/Behavioral: Negative for depression, anxiety, suicidal ideas, insomnia and memory loss.      Exam:  Blood pressure 120/66, pulse 86, temperature 36.4 °C (97.6 °F), resp. rate 18, height 1.626 m (5' 4.02\"), weight 134.265 kg (296 lb), SpO2 96 %.    General:  Well nourished, well developed in NAD  Head is grossly normal.  Neck: Supple without JVD   Pulmonary:  Normal effort.   Cardiovascular: Regular rate  Extremities: no clubbing, cyanosis, or edema.  Psych: affect appropriate      Please note that this dictation was created using voice recognition software. I have made every reasonable attempt to correct obvious errors, but I expect that there are errors of grammar and possibly content that I did not discover before finalizing the note.    Assessment/Plan:  1. Obesity, morbid, BMI 50 or higher (CMS-HCC)  - BELVIQ 10 MG Tab; Take 1 Tab by mouth 2 Times a Day.  " Dispense: 60 Tab; Refill: 2    2. Acne, unspecified acne type  - doxycycline (VIBRAMYCIN) 100 MG Tab; Take 1 Tab by mouth 2 times a day for 14 days.  Dispense: 28 Tab; Refill: 0    3. Acquired hypothyroidism  - TSH WITH REFLEX TO FT4; Future  Continue current dose, check levels, refill or adjust as needed.     4. JOSE on CPAP  Continue CPAP  - Suvorexant (BELSOMRA) 20 MG Tab; Take 1 Tab by mouth at bedtime as needed (for insomnia). Take 1 tablet by mouth at bedtime as needed for insomnia.  Dispense: 30 Tab; Refill: 3    5. Insomnia, unspecified type  Continue   - Suvorexant (BELSOMRA) 20 MG Tab; Take 1 Tab by mouth at bedtime as needed (for insomnia). Take 1 tablet by mouth at bedtime as needed for insomnia.  Dispense: 30 Tab; Refill: 3    6. Polyarthralgia  Continue   - duloxetine (CYMBALTA) 60 MG Cap DR Particles delayed-release capsule; Take 1 Cap by mouth every day.  Dispense: 90 Cap; Refill: 3    7. Positive JETT (antinuclear antibody)  8. Chronic granulomatous disease (HCC)  Awaiting rheum consult.      Follow up 1 month as planned.

## 2017-05-30 ENCOUNTER — TELEPHONE (OUTPATIENT)
Dept: MEDICAL GROUP | Facility: PHYSICIAN GROUP | Age: 52
End: 2017-05-30

## 2017-05-30 DIAGNOSIS — E03.9 ACQUIRED HYPOTHYROIDISM: ICD-10-CM

## 2017-05-30 RX ORDER — LEVOTHYROXINE SODIUM 112 MCG
112 TABLET ORAL
Qty: 90 TAB | Refills: 3 | Status: SHIPPED | OUTPATIENT
Start: 2017-05-30 | End: 2018-05-30 | Stop reason: SDUPTHER

## 2017-05-30 NOTE — TELEPHONE ENCOUNTER
"----- Message from Dorys Young M.D. sent at 5/30/2017  7:33 AM PDT -----  Thyroid is on the border of being \"over-replaced\", if she is feeling anxious, jittery and more fatigued and getting hot flashes or palpitations we should decrease the dose.  "

## 2017-05-30 NOTE — TELEPHONE ENCOUNTER
Please call Polly back then, I'd like to reduce her dose to the 112 mcg and recheck labs in 3 months.

## 2017-06-09 RX ORDER — DICLOFENAC SODIUM 75 MG/1
TABLET, DELAYED RELEASE ORAL
Qty: 180 TAB | Refills: 0 | Status: SHIPPED | OUTPATIENT
Start: 2017-06-09 | End: 2017-08-18

## 2017-06-09 NOTE — TELEPHONE ENCOUNTER
Was the patient seen in the last year in this department? Yes     Does patient have an active prescription for medications requested? No     Received Request Via: Pharmacy      Pt met protocol?: Yes, labs 4/17 and 5/17 ov 5/17

## 2017-06-16 ENCOUNTER — OFFICE VISIT (OUTPATIENT)
Dept: MEDICAL GROUP | Facility: PHYSICIAN GROUP | Age: 52
End: 2017-06-16
Payer: COMMERCIAL

## 2017-06-16 VITALS
RESPIRATION RATE: 16 BRPM | BODY MASS INDEX: 50.02 KG/M2 | OXYGEN SATURATION: 97 % | WEIGHT: 293 LBS | HEIGHT: 64 IN | DIASTOLIC BLOOD PRESSURE: 82 MMHG | HEART RATE: 70 BPM | TEMPERATURE: 97.7 F | SYSTOLIC BLOOD PRESSURE: 124 MMHG

## 2017-06-16 DIAGNOSIS — G47.33 OSA ON CPAP: ICD-10-CM

## 2017-06-16 DIAGNOSIS — E03.9 ACQUIRED HYPOTHYROIDISM: ICD-10-CM

## 2017-06-16 DIAGNOSIS — F43.0 ACUTE STRESS REACTION: ICD-10-CM

## 2017-06-16 DIAGNOSIS — R76.8 POSITIVE ANA (ANTINUCLEAR ANTIBODY): ICD-10-CM

## 2017-06-16 DIAGNOSIS — G47.00 INSOMNIA, UNSPECIFIED TYPE: ICD-10-CM

## 2017-06-16 PROCEDURE — 99214 OFFICE O/P EST MOD 30 MIN: CPT | Performed by: FAMILY MEDICINE

## 2017-06-16 ASSESSMENT — PATIENT HEALTH QUESTIONNAIRE - PHQ9: CLINICAL INTERPRETATION OF PHQ2 SCORE: 2

## 2017-06-16 NOTE — Clinical Note
"June 16, 2017        Polly Menchaca       It is my medical opinion that Polly not be permitted to work as an \"\" due to risk of heights with her chronic medical conditions.                             Dorys Young MD    "

## 2017-06-16 NOTE — PROGRESS NOTES
"Chief Complaint   Patient presents with   • Follow-Up       HISTORY OF PRESENT ILLNESS: Patient is a 51 y.o. female established patient here today for the following concerns:    1. Acquired hypothyroidism  Here today for follow up.  Reports good adherence to her thyroid medication.  She is due for recheck in the next 3 months.  Reports that she is getting some \"hot flashes\".  Last month we decreased her thyroid dose from 125 to 112 as she appeared to be a bit hyperthyroid.   She reports chronic fatigue, but relates it more recently to stress.     2. Insomnia, unspecified type  Not sleeping as well due to both work and personal stress within the family.  She continues to take the belsomra which has been the most effective sleep aid.  She is working on trying to cope with the increases in stress.     3. JOSE on CPAP  Continues on CPAP.  Due to this she does have a difficult time working anything other than day shift.  She has good adherence to CPAP therapy.  She does have daytime somnolence, but not as severe as before starting therapy.     4. Positive JETT (antinuclear antibody)  At this time still awaiting rheumatology consultation.  She is on their wait list for sooner appointment.  She has had bilateral hand swelling, redness, malar rash and intermittent vascular type rashes over the legs.      5. Acute stress reaction  As mentioned above.  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  She requests an updated letter for her work restrictions.  She also has an ongoing workers comp case that is essentially preventing her from leaving.  She has ongoing family stress for her children as well.      6. Adult BMI 50.0-59.9 kg/sq m (CMS-[facility])  Continues with the Belviq.  3 lb weight gain this last month.  Patient admits to stress eating.        Past Medical, Social, and Family history reviewed and updated in EPIC    Allergies:Iodine and Tape    Current Outpatient " "Prescriptions   Medication Sig Dispense Refill   • diclofenac EC (VOLTAREN) 75 MG Tablet Delayed Response TAKE 1 TABLET BY MOUTH TWICE DAILY 180 Tab 0   • SYNTHROID 112 MCG Tab Take 1 Tab by mouth Every morning on an empty stomach. 90 Tab 3   • BELVIQ 10 MG Tab Take 1 Tab by mouth 2 Times a Day. 60 Tab 2   • Suvorexant (BELSOMRA) 20 MG Tab Take 1 Tab by mouth at bedtime as needed (for insomnia). Take 1 tablet by mouth at bedtime as needed for insomnia. 30 Tab 3   • duloxetine (CYMBALTA) 60 MG Cap DR Particles delayed-release capsule Take 1 Cap by mouth every day. 90 Cap 3   • valacyclovir (VALTREX) 1 GM Tab TAKE 1 TABLET BY MOUTH EVERY MORNING 90 Tab 0     No current facility-administered medications for this visit.         ROS:  Review of Systems   Constitutional: Negative for fever, chills, weight loss and +malaise/fatigue.   HENT: Negative for ear pain, nosebleeds, congestion, sore throat and neck pain.    Eyes: Negative for blurred vision.   Respiratory: Negative for cough, sputum production, shortness of breath and wheezing.    Cardiovascular: Negative for chest pain, palpitations,  and leg swelling.   Gastrointestinal: Negative for heartburn, nausea, vomiting, diarrhea and abdominal pain.   Genitourinary: Negative for dysuria, urgency and frequency.   Musculoskeletal: Negative for myalgias, back pain and+ joint pain.   Skin: + for rash and itching.   Neurological: Negative for dizziness, tingling, tremors, sensory change, focal weakness and headaches.   Endo/Heme/Allergies: Does not bruise/bleed easily.   Psychiatric/Behavioral: Negative for depression, anxiety, suicidal ideas, insomnia and memory loss.      Exam:  Blood pressure 124/82, pulse 70, temperature 36.5 °C (97.7 °F), resp. rate 16, height 1.626 m (5' 4.02\"), weight 135.626 kg (299 lb), SpO2 97 %.    General:  Well nourished, well developed in NAD  Head is grossly normal.  Neck: Supple without JVD   Pulmonary:  Normal effort.   Cardiovascular: " Regular rate  Extremities: no clubbing, cyanosis, or edema.  Psych: affect appropriate      Please note that this dictation was created using voice recognition software. I have made every reasonable attempt to correct obvious errors, but I expect that there are errors of grammar and possibly content that I did not discover before finalizing the note.    Assessment/Plan:  1. Acquired hypothyroidism  Continue current dose.  Recheck in 3 months.     2. Insomnia, unspecified type  Continue Belsomra.     3. JOSE on CPAP  Continue CPAP therapy.     4. Positive JETT (antinuclear antibody)  Awaiting rheum consultation    5. Acute stress reaction  Stress reduction, regular exercise.     6. Adult BMI 50.0-59.9 kg/sq m (CMS-[facility])  Continue Belviq.  Work on stress eating, by increasing daily walking.      1 month follow up

## 2017-06-16 NOTE — MR AVS SNAPSHOT
"        Polly Isabel Nikolai   2017 7:00 AM   Office Visit   MRN: [id number]    Department:  [facility]   Dept Phone:  [phone]    Description:  Female : 1965   Provider:  Dorys Young M.D.           Reason for Visit     Follow-Up           Allergies as of 2017     Allergen Noted Reactions    Iodine 2010       IV iodine causes syncope    Tape 2010   Rash    SILK; may use paper tape      You were diagnosed with     Acquired hypothyroidism   [2148105]       Insomnia, unspecified type   [3446006]       JOSE on CPAP   [412737]       Positive JETT (antinuclear antibody)   [037743]       Acute stress reaction   [797053]       Adult BMI 50.0-59.9 kg/sq m (CMS-[facility])   [147936]         Vital Signs     Blood Pressure Pulse Temperature Respirations Height Weight    124/82 mmHg 70 36.5 °C (97.7 °F) 16 1.626 m (5' 4.02\") 135.626 kg (299 lb)    Body Mass Index Oxygen Saturation Smoking Status             51.30 kg/m2 97% Never Smoker          Basic Information     Date Of Birth Sex Race Ethnicity Preferred Language    1965 Female White Non- English      Your appointments     2017  7:20 AM   Established Patient with Dorys Young M.D.   [address] [address] [phone]           You will be receiving a confirmation call a few days before your appointment from our automated call confirmation system.            Aug 18, 2017  7:20 AM   Established Patient with Dorys Young M.D.   [address] [id number] [phone]           You will be receiving a confirmation call a few days before your appointment from our automated call confirmation system.            2017  8:00 AM   Follow UP with QUINTON Heredia   [facility] (--)    [address] [place] [id number]   "   [phone]              Problem List              ICD-10-CM Priority Class Noted - Resolved    Other specified symptom associated with female genital organs N94.89   Unknown - Present    Stress incontinence N39.3   5/27/2014 - Present    Osteoarthritis M19.90   9/12/2014 - Present    Hypothyroidism E03.9   9/12/2014 - Present    Insomnia G47.00   9/12/2014 - Present    JOSE on CPAP G47.33, Z99.89   9/12/2014 - Present    Chronic granulomatous disease (HCC) D71   9/12/2014 - Present    History of cold sores Z86.19   5/2/2016 - Present    Hypersomnia G47.10   5/6/2016 - Present    Adult BMI 50.0-59.9 kg/sq m (CMS-HCC) Z68.43   1/12/2017 - Present    Positive JETT (antinuclear antibody) R76.8   5/26/2017 - Present      Health Maintenance        Date Due Completion Dates    COLON CANCER SCREENING ANNUAL FIT 12/25/2017 12/25/2016    MAMMOGRAM 1/12/2018 1/12/2017, 5/1/2015, 9/25/2014    IMM DTaP/Tdap/Td Vaccine (2 - Td) 8/20/2025 8/20/2015            Current Immunizations     Influenza Vaccine Quad Inj (Preserved) 11/3/2016    Tdap Vaccine 8/20/2015      Below and/or attached are the medications your provider expects you to take. Review all of your home medications and newly ordered medications with your provider and/or pharmacist. Follow medication instructions as directed by your provider and/or pharmacist. Please keep your medication list with you and share with your provider. Update the information when medications are discontinued, doses are changed, or new medications (including over-the-counter products) are added; and carry medication information at all times in the event of emergency situations     Allergies:  IODINE - (reactions not documented)     TAPE - Rash               Medications  Valid as of: June 16, 2017 -  7:25 AM    Generic Name Brand Name Tablet Size Instructions for use    Diclofenac Sodium (Tablet Delayed Response) VOLTAREN 75 MG TAKE 1 TABLET BY MOUTH TWICE DAILY        DULoxetine HCl (Cap   Particles) CYMBALTA 60 MG Take 1 Cap by mouth every day.        Levothyroxine Sodium (Tab) SYNTHROID 112 MCG Take 1 Tab by mouth Every morning on an empty stomach.        Lorcaserin HCl (Tab) BELVIQ 10 MG Take 1 Tab by mouth 2 Times a Day.        Suvorexant (Tab) Suvorexant 20 MG Take 1 Tab by mouth at bedtime as needed (for insomnia). Take 1 tablet by mouth at bedtime as needed for insomnia.        ValACYclovir HCl (Tab) VALTREX 1 GM TAKE 1 TABLET BY MOUTH EVERY MORNING        .                 Medicines prescribed today were sent to:     [facility] [id number] [place] [address], [place] - [address] [facility] [address] [facility] [address]    [address] [facility] [address] [place] [address]    Phone: [phone] Fax: [phone]    Open 24 Hours?: No      Medication refill instructions:       If your prescription bottle indicates you have medication refills left, it is not necessary to call your provider’s office. Please contact your pharmacy and they will refill your medication.    If your prescription bottle indicates you do not have any refills left, you may request refills at any time through one of the following ways: The online [employer] system (except Urgent Care), by calling your provider’s office, or by asking your pharmacy to contact your provider’s office with a refill request. Medication refills are processed only during regular business hours and may not be available until the next business day. Your provider may request additional information or to have a follow-up visit with you prior to refilling your medication.   *Please Note: Medication refills are assigned a new Rx number when refilled electronically. Your pharmacy may indicate that no refills were authorized even though a new prescription for the same medication is available at the pharmacy. Please request the medicine by name with the pharmacy before contacting your provider for a refill.           [employer] Access Code: Activation code not  generated  Current MyChart Status: Active

## 2017-06-16 NOTE — Clinical Note
June 16, 2017        Patient: Polly Menchaca   YOB: 1965   Date of Visit: 6/16/2017                 To whom it May concern;                In my opinion, Polly should remain permanently on a day shift due to her chronic medical conditions.            Dorys Young M.D.  Electronically Signed    [address] [id number] [phone] (Phone)  [phone] (Fax)

## 2017-06-29 RX ORDER — HYDROCHLOROTHIAZIDE 12.5 MG/1
CAPSULE, GELATIN COATED ORAL
Refills: 0 | OUTPATIENT
Start: 2017-06-29

## 2017-06-29 NOTE — TELEPHONE ENCOUNTER
Was the patient seen in the last year in this department? Yes     Does patient have an active prescription for medications requested? No     Received Request Via: Pharmacy      Pt met protocol?: Yes pt last ov 6/2017 medication was last d/c by Hector 4/27/17    BP Readings from Last 1 Encounters:   06/16/17 124/82

## 2017-06-30 RX ORDER — HYDROCHLOROTHIAZIDE 12.5 MG/1
CAPSULE, GELATIN COATED ORAL
Qty: 90 CAP | Refills: 0 | Status: SHIPPED | OUTPATIENT
Start: 2017-06-30 | End: 2017-09-10 | Stop reason: SDUPTHER

## 2017-06-30 NOTE — TELEPHONE ENCOUNTER
Dr Young- It says that 4/27/17 you d/c's this med during OV but I don't see a note stating why. Please refill as you see fit.

## 2017-07-06 RX ORDER — VALACYCLOVIR HYDROCHLORIDE 1 G/1
TABLET, FILM COATED ORAL
Qty: 90 TAB | Refills: 3 | Status: SHIPPED | OUTPATIENT
Start: 2017-07-06 | End: 2018-09-01 | Stop reason: SDUPTHER

## 2017-07-21 ENCOUNTER — OFFICE VISIT (OUTPATIENT)
Dept: MEDICAL GROUP | Facility: PHYSICIAN GROUP | Age: 52
End: 2017-07-21
Payer: COMMERCIAL

## 2017-07-21 VITALS
DIASTOLIC BLOOD PRESSURE: 80 MMHG | TEMPERATURE: 97.6 F | SYSTOLIC BLOOD PRESSURE: 128 MMHG | RESPIRATION RATE: 18 BRPM | BODY MASS INDEX: 50.02 KG/M2 | HEIGHT: 64 IN | WEIGHT: 293 LBS | HEART RATE: 72 BPM | OXYGEN SATURATION: 96 %

## 2017-07-21 DIAGNOSIS — H92.01 OTALGIA, RIGHT: ICD-10-CM

## 2017-07-21 DIAGNOSIS — R10.13 EPIGASTRIC ABDOMINAL PAIN: ICD-10-CM

## 2017-07-21 DIAGNOSIS — R07.9 CHEST PAIN, UNSPECIFIED TYPE: ICD-10-CM

## 2017-07-21 PROCEDURE — 93000 ELECTROCARDIOGRAM COMPLETE: CPT | Performed by: FAMILY MEDICINE

## 2017-07-21 PROCEDURE — 99214 OFFICE O/P EST MOD 30 MIN: CPT | Performed by: FAMILY MEDICINE

## 2017-07-21 RX ORDER — OMEPRAZOLE 20 MG/1
20 CAPSULE, DELAYED RELEASE ORAL DAILY
Qty: 30 CAP | Refills: 11 | Status: SHIPPED | OUTPATIENT
Start: 2017-07-21 | End: 2017-08-18

## 2017-07-21 RX ORDER — DICLOFENAC SODIUM 75 MG/1
TABLET, DELAYED RELEASE ORAL
Qty: 180 TAB | Refills: 0 | Status: CANCELLED | OUTPATIENT
Start: 2017-07-21

## 2017-07-21 NOTE — PROGRESS NOTES
"Chief Complaint   Patient presents with   • Follow-Up   • Otalgia     rt ear pain   • Medication Refill     diclofenac       HISTORY OF PRESENT ILLNESS: Patient is a 51 y.o. female established patient here today for the following concerns:    1. Epigastric abdominal pain  3. Chest pain, unspecified type  Here today with a new problem.  She reports that she has been experiencing several weeks of intermittent epigastic aching pain and radiation into the chest that is \"like a hot poker\" that lasts about 5 minutes and then goes away.  Not particularly associated with food or liquid intake.  Reports no symptoms of \"heart burn\" that she can relate.  No change in diet.  She is taking Diclofenac for polyarthralgias with positive JETT.    Denies any melena or hematochezia.      2. Otalgia, right  Reports intermittent right ear pain, sore throat.  NO fevers or chills.  Has had more nasal congestion and headaches.            Past Medical, Social, and Family history reviewed and updated in EPIC    Allergies:Iodine and Tape    Current Outpatient Prescriptions   Medication Sig Dispense Refill   • omeprazole (PRILOSEC) 20 MG delayed-release capsule Take 1 Cap by mouth every day. 30 Cap 11   • valacyclovir (VALTREX) 1 GM Tab TAKE 1 TABLET BY MOUTH EVERY MORNING 90 Tab 3   • hydrochlorothiazide (MICROZIDE) 12.5 MG capsule TAKE 1 CAPSULE BY MOUTH EVERY DAY 90 Cap 0   • diclofenac EC (VOLTAREN) 75 MG Tablet Delayed Response TAKE 1 TABLET BY MOUTH TWICE DAILY 180 Tab 0   • SYNTHROID 112 MCG Tab Take 1 Tab by mouth Every morning on an empty stomach. 90 Tab 3   • BELVIQ 10 MG Tab Take 1 Tab by mouth 2 Times a Day. 60 Tab 2   • Suvorexant (BELSOMRA) 20 MG Tab Take 1 Tab by mouth at bedtime as needed (for insomnia). Take 1 tablet by mouth at bedtime as needed for insomnia. 30 Tab 3   • duloxetine (CYMBALTA) 60 MG Cap DR Particles delayed-release capsule Take 1 Cap by mouth every day. 90 Cap 3     No current facility-administered medications " "for this visit.         ROS:  Review of Systems   Constitutional: Negative for fever, chills, weight loss and malaise/fatigue.   HENT: Negative for ear pain, nosebleeds, congestion, sore throat and neck pain.    Eyes: Negative for blurred vision.   Respiratory: Negative for cough, sputum production, shortness of breath and wheezing.    Cardiovascular: Negative for chest pain, palpitations,  and leg swelling.   Gastrointestinal: Negative for heartburn, nausea, vomiting, diarrhea and abdominal pain.   Genitourinary: Negative for dysuria, urgency and frequency.   Musculoskeletal: Negative for myalgias, back pain and joint pain.   Skin: Negative for rash and itching.   Neurological: Negative for dizziness, tingling, tremors, sensory change, focal weakness and headaches.   Endo/Heme/Allergies: Does not bruise/bleed easily.   Psychiatric/Behavioral: Negative for depression, anxiety, suicidal ideas, insomnia and memory loss.      Exam:  Blood pressure 128/80, pulse 72, temperature 36.4 °C (97.6 °F), resp. rate 18, height 1.626 m (5' 4.02\"), weight 136.079 kg (300 lb), SpO2 96 %.    General:  Well nourished, well developed in NAD  Head is grossly normal.  HEENT: Normocephalic and atraumatic. TMs clear bilaterally. Oropharynx is clear without erythema and no tonsillar exudate. Nasal mucosa pale pink, edematous with minimal rhinorrhea.  Neck: Supple without JVD   Pulmonary:  Normal effort. CTAB  Cardiovascular: Regular rate and rhythm no m/r/g  Extremities: no clubbing, cyanosis, or edema.  Psych: affect appropriate    Please note that this dictation was created using voice recognition software. I have made every reasonable attempt to correct obvious errors, but I expect that there are errors of grammar and possibly content that I did not discover before finalizing the note.    Assessment/Plan:  1. Epigastric abdominal pain  - omeprazole (PRILOSEC) 20 MG delayed-release capsule; Take 1 Cap by mouth every day.  Dispense: 30 " Cap; Refill: 11  D/c diclofenac at this time.  May try Tylenol for pain.  Keep follow up with rheumatology     2. Otalgia, right  Likely eustachian tube dysfunction.  May try OTC flonase to help.    3. Chest pain, unspecified type  - EKG - Clinic Performed  EKG Interpretation-HR is NSR no ST/Tw changes.

## 2017-07-21 NOTE — MR AVS SNAPSHOT
"        Polly Menchaca   2017 7:20 AM   Office Visit   MRN: [id number]    Department:  [facility]   Dept Phone:  [phone]    Description:  Female : 1965   Provider:  Dorys Young M.D.           Reason for Visit     Follow-Up     Otalgia rt ear pain    Medication Refill diclofenac      Allergies as of 2017     Allergen Noted Reactions    Iodine 2010       IV iodine causes syncope    Tape 2010   Rash    SILK; may use paper tape      You were diagnosed with     Epigastric abdominal pain   [815857]       Otalgia, right   [962314]       Chest pain, unspecified type   [9301585]         Vital Signs     Blood Pressure Pulse Temperature Respirations Height Weight    128/80 mmHg 72 36.4 °C (97.6 °F) 18 1.626 m (5' 4.02\") 136.079 kg (300 lb)    Body Mass Index Oxygen Saturation Smoking Status             51.47 kg/m2 96% Never Smoker          Basic Information     Date Of Birth Sex Race Ethnicity Preferred Language    1965 Female White Non- English      Your appointments     Aug 18, 2017  7:20 AM   Established Patient with Dorys Young M.D.   [facility]    [address] [facility] [address] [phone]           You will be receiving a confirmation call a few days before your appointment from our automated call confirmation system.            Sep 22, 2017  7:00 AM   Established Patient with Dorys Young M.D.   [facility]    [address] [facility] [address] [phone]           You will be receiving a confirmation call a few days before your appointment from our automated call confirmation system.            2017  8:00 AM   Follow UP with QUINTON Heredia   [facility] (--)    [address] [place] [id number] [phone]              Problem List              ICD-10-CM Priority Class Noted - Resolved   " Other specified symptom associated with female genital organs N94.89   Unknown - Present    Stress incontinence N39.3   5/27/2014 - Present    Osteoarthritis M19.90   9/12/2014 - Present    Hypothyroidism E03.9   9/12/2014 - Present    Insomnia G47.00   9/12/2014 - Present    JOSE on CPAP G47.33, Z99.89   9/12/2014 - Present    Chronic granulomatous disease (HCC) D71   9/12/2014 - Present    History of cold sores Z86.19   5/2/2016 - Present    Hypersomnia G47.10   5/6/2016 - Present    Adult BMI 50.0-59.9 kg/sq m (CMS-HCC) Z68.43   1/12/2017 - Present    Positive JETT (antinuclear antibody) R76.8   5/26/2017 - Present      Health Maintenance        Date Due Completion Dates    IMM INFLUENZA (1) 9/1/2017 11/3/2016    COLON CANCER SCREENING ANNUAL FIT 12/25/2017 12/25/2016    MAMMOGRAM 1/12/2018 1/12/2017, 5/1/2015, 9/25/2014    IMM DTaP/Tdap/Td Vaccine (2 - Td) 8/20/2025 8/20/2015            Current Immunizations     Influenza Vaccine Quad Inj (Preserved) 11/3/2016    Tdap Vaccine 8/20/2015      Below and/or attached are the medications your provider expects you to take. Review all of your home medications and newly ordered medications with your provider and/or pharmacist. Follow medication instructions as directed by your provider and/or pharmacist. Please keep your medication list with you and share with your provider. Update the information when medications are discontinued, doses are changed, or new medications (including over-the-counter products) are added; and carry medication information at all times in the event of emergency situations     Allergies:  IODINE - (reactions not documented)     TAPE - Rash               Medications  Valid as of: July 21, 2017 -  7:44 AM    Generic Name Brand Name Tablet Size Instructions for use    Diclofenac Sodium (Tablet Delayed Response) VOLTAREN 75 MG TAKE 1 TABLET BY MOUTH TWICE DAILY        DULoxetine HCl (Cap DR Particles) CYMBALTA 60 MG Take 1 Cap by mouth every day.          HydroCHLOROthiazide (Cap) MICROZIDE 12.5 MG TAKE 1 CAPSULE BY MOUTH EVERY DAY        Levothyroxine Sodium (Tab) SYNTHROID 112 MCG Take 1 Tab by mouth Every morning on an empty stomach.        Lorcaserin HCl (Tab) BELVIQ 10 MG Take 1 Tab by mouth 2 Times a Day.        Omeprazole (CAPSULE DELAYED RELEASE) PRILOSEC 20 MG Take 1 Cap by mouth every day.        Suvorexant (Tab) Suvorexant 20 MG Take 1 Tab by mouth at bedtime as needed (for insomnia). Take 1 tablet by mouth at bedtime as needed for insomnia.        ValACYclovir HCl (Tab) VALTREX 1 GM TAKE 1 TABLET BY MOUTH EVERY MORNING        .                 Medicines prescribed today were sent to:     [facility] [address] [place] [address] - [address] [facility] [address] [place] [address] & [place]    [address] [facility] [address] [place] [address]    Phone: [phone] Fax: [phone]    Open 24 Hours?: No      Medication refill instructions:       If your prescription bottle indicates you have medication refills left, it is not necessary to call your provider’s office. Please contact your pharmacy and they will refill your medication.    If your prescription bottle indicates you do not have any refills left, you may request refills at any time through one of the following ways: The online [employer] system (except Urgent Care), by calling your provider’s office, or by asking your pharmacy to contact your provider’s office with a refill request. Medication refills are processed only during regular business hours and may not be available until the next business day. Your provider may request additional information or to have a follow-up visit with you prior to refilling your medication.   *Please Note: Medication refills are assigned a new Rx number when refilled electronically. Your pharmacy may indicate that no refills were authorized even though a new prescription for the same medication is available at the pharmacy. Please request the medicine by name with the pharmacy  before contacting your provider for a refill.           MyChart Access Code: Activation code not generated  Current MyChart Status: Active

## 2017-08-18 ENCOUNTER — TELEPHONE (OUTPATIENT)
Dept: MEDICAL GROUP | Facility: PHYSICIAN GROUP | Age: 52
End: 2017-08-18

## 2017-08-18 ENCOUNTER — OFFICE VISIT (OUTPATIENT)
Dept: MEDICAL GROUP | Facility: PHYSICIAN GROUP | Age: 52
End: 2017-08-18
Payer: COMMERCIAL

## 2017-08-18 ENCOUNTER — HOSPITAL ENCOUNTER (OUTPATIENT)
Dept: LAB | Facility: MEDICAL CENTER | Age: 52
End: 2017-08-18
Attending: INTERNAL MEDICINE
Payer: COMMERCIAL

## 2017-08-18 ENCOUNTER — HOSPITAL ENCOUNTER (OUTPATIENT)
Dept: RADIOLOGY | Facility: MEDICAL CENTER | Age: 52
End: 2017-08-18
Attending: FAMILY MEDICINE
Payer: COMMERCIAL

## 2017-08-18 ENCOUNTER — HOSPITAL ENCOUNTER (OUTPATIENT)
Dept: LAB | Facility: MEDICAL CENTER | Age: 52
End: 2017-08-18
Attending: FAMILY MEDICINE
Payer: COMMERCIAL

## 2017-08-18 VITALS
OXYGEN SATURATION: 96 % | BODY MASS INDEX: 50.02 KG/M2 | HEART RATE: 82 BPM | TEMPERATURE: 97.2 F | WEIGHT: 293 LBS | HEIGHT: 64 IN | RESPIRATION RATE: 16 BRPM | DIASTOLIC BLOOD PRESSURE: 78 MMHG | SYSTOLIC BLOOD PRESSURE: 120 MMHG

## 2017-08-18 DIAGNOSIS — R76.8 POSITIVE ANA (ANTINUCLEAR ANTIBODY): ICD-10-CM

## 2017-08-18 DIAGNOSIS — M25.562 CHRONIC PAIN OF LEFT KNEE: ICD-10-CM

## 2017-08-18 DIAGNOSIS — Z96.652 STATUS POST TOTAL LEFT KNEE REPLACEMENT: ICD-10-CM

## 2017-08-18 DIAGNOSIS — M25.50 POLYARTHRALGIA: ICD-10-CM

## 2017-08-18 DIAGNOSIS — R21 MALAR RASH: ICD-10-CM

## 2017-08-18 DIAGNOSIS — G89.29 CHRONIC PAIN OF LEFT KNEE: ICD-10-CM

## 2017-08-18 DIAGNOSIS — E03.9 ACQUIRED HYPOTHYROIDISM: ICD-10-CM

## 2017-08-18 LAB
25(OH)D3 SERPL-MCNC: 23 NG/ML (ref 30–100)
APPEARANCE UR: ABNORMAL
BACTERIA #/AREA URNS HPF: ABNORMAL /HPF
BASOPHILS # BLD AUTO: 0.4 % (ref 0–1.8)
BASOPHILS # BLD: 0.02 K/UL (ref 0–0.12)
BILIRUB UR QL STRIP.AUTO: NEGATIVE
COLOR UR: YELLOW
CREAT UR-MCNC: 112.9 MG/DL
CULTURE IF INDICATED INDCX: YES UA CULTURE
EOSINOPHIL # BLD AUTO: 0.14 K/UL (ref 0–0.51)
EOSINOPHIL NFR BLD: 3 % (ref 0–6.9)
EPI CELLS #/AREA URNS HPF: ABNORMAL /HPF
ERYTHROCYTE [DISTWIDTH] IN BLOOD BY AUTOMATED COUNT: 45.4 FL (ref 35.9–50)
ERYTHROCYTE [SEDIMENTATION RATE] IN BLOOD BY WESTERGREN METHOD: 37 MM/HOUR (ref 0–30)
GLUCOSE UR STRIP.AUTO-MCNC: NEGATIVE MG/DL
HCT VFR BLD AUTO: 44.3 % (ref 37–47)
HGB BLD-MCNC: 14.6 G/DL (ref 12–16)
HYALINE CASTS #/AREA URNS LPF: ABNORMAL /LPF
IMM GRANULOCYTES # BLD AUTO: 0.01 K/UL (ref 0–0.11)
IMM GRANULOCYTES NFR BLD AUTO: 0.2 % (ref 0–0.9)
KETONES UR STRIP.AUTO-MCNC: NEGATIVE MG/DL
LEUKOCYTE ESTERASE UR QL STRIP.AUTO: ABNORMAL
LYMPHOCYTES # BLD AUTO: 1.17 K/UL (ref 1–4.8)
LYMPHOCYTES NFR BLD: 25.1 % (ref 22–41)
MCH RBC QN AUTO: 32.4 PG (ref 27–33)
MCHC RBC AUTO-ENTMCNC: 33 G/DL (ref 33.6–35)
MCV RBC AUTO: 98.2 FL (ref 81.4–97.8)
MICRO URNS: ABNORMAL
MONOCYTES # BLD AUTO: 0.32 K/UL (ref 0–0.85)
MONOCYTES NFR BLD AUTO: 6.9 % (ref 0–13.4)
NEUTROPHILS # BLD AUTO: 3 K/UL (ref 2–7.15)
NEUTROPHILS NFR BLD: 64.4 % (ref 44–72)
NITRITE UR QL STRIP.AUTO: NEGATIVE
NRBC # BLD AUTO: 0 K/UL
NRBC BLD AUTO-RTO: 0 /100 WBC
PH UR STRIP.AUTO: 6.5 [PH]
PLATELET # BLD AUTO: 218 K/UL (ref 164–446)
PMV BLD AUTO: 11.9 FL (ref 9–12.9)
PROT UR QL STRIP: NEGATIVE MG/DL
PROT UR-MCNC: 8 MG/DL (ref 0–15)
PROT/CREAT UR: 71 MG/G (ref 10–107)
RBC # BLD AUTO: 4.51 M/UL (ref 4.2–5.4)
RBC # URNS HPF: ABNORMAL /HPF
RBC UR QL AUTO: NEGATIVE
SP GR UR STRIP.AUTO: 1.02
T4 FREE SERPL-MCNC: 1.15 NG/DL (ref 0.53–1.43)
TSH SERPL DL<=0.005 MIU/L-ACNC: 0.1 UIU/ML (ref 0.3–3.7)
UROBILINOGEN UR STRIP.AUTO-MCNC: 0.2 MG/DL
WBC # BLD AUTO: 4.7 K/UL (ref 4.8–10.8)
WBC #/AREA URNS HPF: ABNORMAL /HPF

## 2017-08-18 PROCEDURE — 99214 OFFICE O/P EST MOD 30 MIN: CPT | Performed by: FAMILY MEDICINE

## 2017-08-18 PROCEDURE — 82306 VITAMIN D 25 HYDROXY: CPT

## 2017-08-18 PROCEDURE — 86140 C-REACTIVE PROTEIN: CPT

## 2017-08-18 PROCEDURE — 82570 ASSAY OF URINE CREATININE: CPT

## 2017-08-18 PROCEDURE — 84443 ASSAY THYROID STIM HORMONE: CPT

## 2017-08-18 PROCEDURE — 84460 ALANINE AMINO (ALT) (SGPT): CPT

## 2017-08-18 PROCEDURE — 84450 TRANSFERASE (AST) (SGOT): CPT

## 2017-08-18 PROCEDURE — 81001 URINALYSIS AUTO W/SCOPE: CPT

## 2017-08-18 PROCEDURE — 87086 URINE CULTURE/COLONY COUNT: CPT

## 2017-08-18 PROCEDURE — 84439 ASSAY OF FREE THYROXINE: CPT

## 2017-08-18 PROCEDURE — 82565 ASSAY OF CREATININE: CPT

## 2017-08-18 PROCEDURE — 86200 CCP ANTIBODY: CPT

## 2017-08-18 PROCEDURE — 73564 X-RAY EXAM KNEE 4 OR MORE: CPT | Mod: LT

## 2017-08-18 PROCEDURE — 82040 ASSAY OF SERUM ALBUMIN: CPT

## 2017-08-18 PROCEDURE — 36415 COLL VENOUS BLD VENIPUNCTURE: CPT

## 2017-08-18 PROCEDURE — 85025 COMPLETE CBC W/AUTO DIFF WBC: CPT

## 2017-08-18 PROCEDURE — 84156 ASSAY OF PROTEIN URINE: CPT

## 2017-08-18 PROCEDURE — 85652 RBC SED RATE AUTOMATED: CPT

## 2017-08-18 NOTE — TELEPHONE ENCOUNTER
1. Caller Name: Jennifer/Dr Salter                                         Call Back Number: [phone]      Patient approves a detailed voicemail message: N\A    2. SPECIFIC Action To Be Taken: Referral pending, please sign.    3. Diagnosis/Clinical Reason for Request: R76.8, M25.50, R21    4. Specialty & Provider Name/Lab/Imaging Location: Dr Salter    5. Is appointment scheduled for requested order/referral: no  Pt had appointment 08/03 and Jennifer states she was told a referral had been submitted.  She states there claim for payment was denied and that no referral had been submitted.  She states need to use claim #[id number] and their tax ID#[id number].  She states pt has [employer] and their referrals are done a little different.    Patient informed they will receive a return phone call from the office ONLY if there are any questions before processing their request. Advised to call back if they haven't received a call from the referral department in 5 days.

## 2017-08-19 LAB
ALBUMIN SERPL BCP-MCNC: 3.8 G/DL (ref 3.2–4.9)
ALT SERPL-CCNC: 32 U/L (ref 2–50)
AST SERPL-CCNC: 45 U/L (ref 12–45)
CREAT SERPL-MCNC: 0.9 MG/DL (ref 0.5–1.4)
CRP SERPL HS-MCNC: 0.9 MG/DL (ref 0–0.75)
GFR SERPL CREATININE-BSD FRML MDRD: >60 ML/MIN/1.73 M 2

## 2017-08-20 LAB
BACTERIA UR CULT: ABNORMAL
BACTERIA UR CULT: ABNORMAL
CCP IGG SERPL-ACNC: 12 UNITS (ref 0–19)
SIGNIFICANT IND 70042: ABNORMAL
SOURCE SOURCE: ABNORMAL

## 2017-09-07 RX ORDER — DICLOFENAC SODIUM 75 MG/1
TABLET, DELAYED RELEASE ORAL
Qty: 180 TAB | Refills: 1 | Status: SHIPPED | OUTPATIENT
Start: 2017-09-07 | End: 2018-04-17 | Stop reason: SDUPTHER

## 2017-09-07 NOTE — TELEPHONE ENCOUNTER
Was the patient seen in the last year in this department? Yes     Does patient have an active prescription for medications requested? No     Received Request Via: Pharmacy      Pt met protocol?: Yes, labs and ov 8/17

## 2017-09-12 RX ORDER — HYDROCHLOROTHIAZIDE 12.5 MG/1
CAPSULE, GELATIN COATED ORAL
Qty: 90 CAP | Refills: 0 | Status: SHIPPED | OUTPATIENT
Start: 2017-09-12 | End: 2017-12-31 | Stop reason: SDUPTHER

## 2017-09-12 NOTE — TELEPHONE ENCOUNTER
Patient has upcoming appointment with PCP, will send three months to pharmacy.  Carlton Thomas, PharmD

## 2017-09-21 ENCOUNTER — TELEPHONE (OUTPATIENT)
Dept: MEDICAL GROUP | Facility: PHYSICIAN GROUP | Age: 52
End: 2017-09-21

## 2017-09-21 NOTE — TELEPHONE ENCOUNTER
ESTABLISHED PATIENT PRE-VISIT PLANNING     Note: Patient will not be contacted if there is no indication to call.     1.  Reviewed notes from the last few office visits within the medical group: Yes    2.  If any orders were placed at last visit or intended to be done for this visit (i.e. 6 mos follow-up), do we have Results/Consult Notes?        •  Labs - Labs ordered, completed and results are in chart.       •  Imaging - Imaging ordered, completed and results are in chart.       •  Referrals - Referral ordered, patient was seen and consult notes are in chart. Care Teams updated  YES.    3. Is this appointment scheduled as a Hospital Follow-Up? No    4.  Immunizations were updated in brettapproved using WebIZ?: Yes       •  Web Iz Recommendations: FLU    5.  Patient is due for the following Health Maintenance Topics:   Health Maintenance Due   Topic Date Due   • IMM INFLUENZA (1) 09/01/2017

## 2017-09-22 ENCOUNTER — OFFICE VISIT (OUTPATIENT)
Dept: MEDICAL GROUP | Facility: PHYSICIAN GROUP | Age: 52
End: 2017-09-22
Payer: COMMERCIAL

## 2017-09-22 VITALS
SYSTOLIC BLOOD PRESSURE: 132 MMHG | BODY MASS INDEX: 50.02 KG/M2 | DIASTOLIC BLOOD PRESSURE: 84 MMHG | RESPIRATION RATE: 18 BRPM | WEIGHT: 293 LBS | HEART RATE: 72 BPM | OXYGEN SATURATION: 96 % | TEMPERATURE: 97.6 F | HEIGHT: 64 IN

## 2017-09-22 DIAGNOSIS — B96.89 BV (BACTERIAL VAGINOSIS): ICD-10-CM

## 2017-09-22 DIAGNOSIS — Z23 NEED FOR INFLUENZA VACCINATION: ICD-10-CM

## 2017-09-22 DIAGNOSIS — N76.0 BV (BACTERIAL VAGINOSIS): ICD-10-CM

## 2017-09-22 PROCEDURE — 90471 IMMUNIZATION ADMIN: CPT | Performed by: FAMILY MEDICINE

## 2017-09-22 PROCEDURE — 90686 IIV4 VACC NO PRSV 0.5 ML IM: CPT | Performed by: FAMILY MEDICINE

## 2017-09-22 PROCEDURE — 99214 OFFICE O/P EST MOD 30 MIN: CPT | Mod: 25 | Performed by: FAMILY MEDICINE

## 2017-09-22 RX ORDER — METRONIDAZOLE 7.5 MG/G
1 GEL VAGINAL
Qty: 1 TUBE | Refills: 2 | Status: SHIPPED | OUTPATIENT
Start: 2017-09-22 | End: 2017-09-29

## 2017-09-22 NOTE — PROGRESS NOTES
Chief Complaint   Patient presents with   • Follow-Up       HISTORY OF PRESENT ILLNESS: Patient is a 51 y.o. female established patient here today for the following concerns:    1. Need for influenza vaccination  Due for flu vaccine.     2. Adult BMI 50.0-59.9 kg/sq m (CMS-[facility])  Here for follow up.  Tells me she did see the orthopedic surgeon on her knees and was told that she needed to lose weight before they could do any surgery.  She is currently on Belviq and is still struggling with her weight.  She does admit when her husbands home he often sabotages her diet. She is working on resisting the tempations.       3. BV (bacterial vaginosis)  This is a new problem.  Reports that every so often she gets foul smelling d/c from the vaginal area, more pronounced after intercourse.  No pain associated.  No dysuria.       Past Medical, Social, and Family history reviewed and updated in EPIC    Allergies:Iodine and Tape    Current Outpatient Prescriptions   Medication Sig Dispense Refill   • metronidazole (METROGEL-VAGINAL) 0.75 % Gel Insert 1 Applicator in vagina every bedtime for 7 days. 1 Tube 2   • hydrochlorothiazide (MICROZIDE) 12.5 MG capsule TAKE 1 CAPSULE BY MOUTH EVERY DAY 90 Cap 0   • diclofenac EC (VOLTAREN) 75 MG Tablet Delayed Response TAKE 1 TABLET BY MOUTH TWICE DAILY 180 Tab 1   • valacyclovir (VALTREX) 1 GM Tab TAKE 1 TABLET BY MOUTH EVERY MORNING 90 Tab 3   • SYNTHROID 112 MCG Tab Take 1 Tab by mouth Every morning on an empty stomach. 90 Tab 3   • Suvorexant (BELSOMRA) 20 MG Tab Take 1 Tab by mouth at bedtime as needed (for insomnia). Take 1 tablet by mouth at bedtime as needed for insomnia. 30 Tab 3   • duloxetine (CYMBALTA) 60 MG Cap DR Particles delayed-release capsule Take 1 Cap by mouth every day. 90 Cap 3     No current facility-administered medications for this visit.          ROS:  Review of Systems   Constitutional: Negative for fever, chills, weight loss and malaise/fatigue.   HENT: Negative  "for ear pain, nosebleeds, congestion, sore throat and neck pain.    Eyes: Negative for blurred vision.   Respiratory: Negative for cough, sputum production, shortness of breath and wheezing.    Cardiovascular: Negative for chest pain, palpitations,  and leg swelling.   Gastrointestinal: Negative for heartburn, nausea, vomiting, diarrhea and abdominal pain.   Genitourinary: Negative for dysuria, urgency and frequency.   Musculoskeletal: Negative for myalgias, back pain and joint pain.   Skin: Negative for rash and itching.   Neurological: Negative for dizziness, tingling, tremors, sensory change, focal weakness and headaches.   Endo/Heme/Allergies: Does not bruise/bleed easily.   Psychiatric/Behavioral: Negative for depression, anxiety, suicidal ideas, insomnia and memory loss.      Exam:  Blood pressure 132/84, pulse 72, temperature 36.4 °C (97.6 °F), resp. rate 18, height 1.626 m (5' 4.02\"), weight (!) 141.5 kg (312 lb), SpO2 96 %.    General:  Well nourished, well developed in NAD  Head is grossly normal.  Neck: Supple without JVD   Pulmonary:  Normal effort.   Cardiovascular: Regular rate  Extremities: no clubbing, cyanosis, or edema.  Psych: affect appropriate      Please note that this dictation was created using voice recognition software. I have made every reasonable attempt to correct obvious errors, but I expect that there are errors of grammar and possibly content that I did not discover before finalizing the note.    Assessment/Plan:  1. Need for influenza vaccination  Due for flu vaccine.   - Flu Quad Inj >3 Year Pre-Filled (Preservative Free)    2. Adult BMI 50.0-59.9 kg/sq m (CMS-[facility])  - REFERRAL TO BARIATRIC SURGERY  Continue Belviq, continue dietary changes    3. BV (bacterial vaginosis)  - metronidazole (METROGEL-VAGINAL) 0.75 % Gel; Insert 1 Applicator in vagina every bedtime for 7 days.  Dispense: 1 Tube; Refill: 2              "

## 2017-10-18 ENCOUNTER — OFFICE VISIT (OUTPATIENT)
Dept: MEDICAL GROUP | Facility: PHYSICIAN GROUP | Age: 52
End: 2017-10-18
Payer: COMMERCIAL

## 2017-10-18 ENCOUNTER — HOSPITAL ENCOUNTER (OUTPATIENT)
Dept: LAB | Facility: MEDICAL CENTER | Age: 52
End: 2017-10-18
Attending: FAMILY MEDICINE
Payer: COMMERCIAL

## 2017-10-18 VITALS
OXYGEN SATURATION: 95 % | SYSTOLIC BLOOD PRESSURE: 114 MMHG | DIASTOLIC BLOOD PRESSURE: 76 MMHG | WEIGHT: 293 LBS | BODY MASS INDEX: 50.02 KG/M2 | TEMPERATURE: 96.2 F | HEART RATE: 76 BPM | HEIGHT: 64 IN | RESPIRATION RATE: 16 BRPM

## 2017-10-18 DIAGNOSIS — G47.00 INSOMNIA, UNSPECIFIED TYPE: ICD-10-CM

## 2017-10-18 DIAGNOSIS — N76.0 BACTERIAL VAGINOSIS: ICD-10-CM

## 2017-10-18 DIAGNOSIS — E03.9 ACQUIRED HYPOTHYROIDISM: ICD-10-CM

## 2017-10-18 DIAGNOSIS — B96.89 BACTERIAL VAGINOSIS: ICD-10-CM

## 2017-10-18 DIAGNOSIS — G47.33 OSA ON CPAP: ICD-10-CM

## 2017-10-18 LAB — TSH SERPL DL<=0.005 MIU/L-ACNC: 0.52 UIU/ML (ref 0.3–3.7)

## 2017-10-18 PROCEDURE — 99214 OFFICE O/P EST MOD 30 MIN: CPT | Performed by: FAMILY MEDICINE

## 2017-10-18 PROCEDURE — 84443 ASSAY THYROID STIM HORMONE: CPT

## 2017-10-18 PROCEDURE — 36415 COLL VENOUS BLD VENIPUNCTURE: CPT

## 2017-10-18 RX ORDER — METRONIDAZOLE 10 MG/G
GEL TOPICAL
Qty: 45 G | Refills: 0 | Status: SHIPPED | OUTPATIENT
Start: 2017-10-18 | End: 2017-11-17

## 2017-10-18 NOTE — PROGRESS NOTES
Chief Complaint   Patient presents with   • Follow-Up       HISTORY OF PRESENT ILLNESS: Patient is a 51 y.o. female established patient here today for the following concerns:    1. Adult BMI 50.0-59.9 kg/sq m (CMS-[facility])  Here today for follow up.  Continues on the Belviq which has for the most part helped to stabilize the weight.  Reports that she continues to work on portion control.  Exercise has been lacking a bit lately due to a worker's comp injury.      2. Bacterial vaginosis  She reports this as a new problem.  After intercourse notes foul vaginal odor and discharge.  No pain.      3. Insomnia, unspecified type  Continues on the Belsomra for insomnia.  This helps somewhat without sedation the following day.     4. JOSE on CPAP  Continues on CPAP.  Improved daytime energy.  Good adherence.  Changing tubing and mask out as directed every 3-6 months.     5. Acquired hypothyroidism  Continues on synthroid 112 mcg       Past Medical, Social, and Family history reviewed and updated in EPIC    Allergies:Iodine and Tape    Current Outpatient Prescriptions   Medication Sig Dispense Refill   • metronidazole (METROGEL) 1 % gel 1 g PV nightly for 1 week 45 g 0   • BELVIQ 10 MG Tab TAKE 1 TABLET BY MOUTH TWICE DAILY 60 Tab 0   • hydrochlorothiazide (MICROZIDE) 12.5 MG capsule TAKE 1 CAPSULE BY MOUTH EVERY DAY 90 Cap 0   • diclofenac EC (VOLTAREN) 75 MG Tablet Delayed Response TAKE 1 TABLET BY MOUTH TWICE DAILY 180 Tab 1   • valacyclovir (VALTREX) 1 GM Tab TAKE 1 TABLET BY MOUTH EVERY MORNING 90 Tab 3   • SYNTHROID 112 MCG Tab Take 1 Tab by mouth Every morning on an empty stomach. 90 Tab 3   • Suvorexant (BELSOMRA) 20 MG Tab Take 1 Tab by mouth at bedtime as needed (for insomnia). Take 1 tablet by mouth at bedtime as needed for insomnia. 30 Tab 3   • duloxetine (CYMBALTA) 60 MG Cap DR Particles delayed-release capsule Take 1 Cap by mouth every day. 90 Cap 3     No current facility-administered medications for this visit.   "        ROS:  Review of Systems   Constitutional: Negative for fever, chills, weight loss and malaise/fatigue.   HENT: Negative for ear pain, nosebleeds, congestion, sore throat and neck pain.    Eyes: Negative for blurred vision.   Respiratory: Negative for cough, sputum production, shortness of breath and wheezing.    Cardiovascular: Negative for chest pain, palpitations,  and leg swelling.   Gastrointestinal: Negative for heartburn, nausea, vomiting, diarrhea and abdominal pain.   Genitourinary: Negative for dysuria, urgency and frequency.   Musculoskeletal: Negative for myalgias, back pain and joint pain.   Skin: Negative for rash and itching.   Neurological: Negative for dizziness, tingling, tremors, sensory change, focal weakness and headaches.   Endo/Heme/Allergies: Does not bruise/bleed easily.   Psychiatric/Behavioral: Negative for depression, anxiety, suicidal ideas, insomnia and memory loss.      Exam:  Blood pressure 114/76, pulse 76, temperature (!) 35.7 °C (96.2 °F), resp. rate 16, height 1.626 m (5' 4.02\"), weight (!) 141.1 kg (311 lb), SpO2 95 %.    General:  Well nourished, well developed in NAD  Head is grossly normal.  Neck: Supple without JVD   Pulmonary:  Normal effort.   Cardiovascular: Regular rate  Extremities: no clubbing, cyanosis, or edema.  Psych: affect appropriate      Please note that this dictation was created using voice recognition software. I have made every reasonable attempt to correct obvious errors, but I expect that there are errors of grammar and possibly content that I did not discover before finalizing the note.    Assessment/Plan:  1. Adult BMI 50.0-59.9 kg/sq m (CMS-[facility])  Counseled.  Continue Belviq, awaiting bariatric consultation.    2. Bacterial vaginosis  start  - metronidazole (METROGEL) 1 % gel; 1 g PV nightly for 1 week  Dispense: 45 g; Refill: 0    3. Insomnia, unspecified type  Continue Belsomra    4. JOSE on CPAP  Continue CPAP    5. Acquired " hypothyroidism  Continue current dose, check levels.   - TSH WITH REFLEX TO FT4; Future    1 month follow up, sooner prn

## 2017-11-09 ENCOUNTER — SLEEP CENTER VISIT (OUTPATIENT)
Dept: SLEEP MEDICINE | Facility: MEDICAL CENTER | Age: 52
End: 2017-11-09
Payer: COMMERCIAL

## 2017-11-09 VITALS
SYSTOLIC BLOOD PRESSURE: 122 MMHG | WEIGHT: 293 LBS | DIASTOLIC BLOOD PRESSURE: 74 MMHG | HEART RATE: 75 BPM | OXYGEN SATURATION: 94 % | BODY MASS INDEX: 50.02 KG/M2 | RESPIRATION RATE: 15 BRPM | HEIGHT: 64 IN

## 2017-11-09 DIAGNOSIS — G47.00 INSOMNIA, UNSPECIFIED TYPE: ICD-10-CM

## 2017-11-09 DIAGNOSIS — G47.33 OSA ON CPAP: ICD-10-CM

## 2017-11-09 PROCEDURE — 99214 OFFICE O/P EST MOD 30 MIN: CPT | Performed by: NURSE PRACTITIONER

## 2017-11-09 NOTE — PATIENT INSTRUCTIONS
1. Continue CPAP nightly  2. Clean mask and tubing weekly  3. Replace mask and supplies as insurance will allow  4. Rx for new supplies to [facility]  5. Rx for Belsomra 20mg qhs provided  6. Referral to Dr. Christina Love for chronic insomnia  7. Follow-up in 6 months, sooner if needed

## 2017-11-09 NOTE — PROGRESS NOTES
Chief Complaint   Patient presents with   • Follow-Up     Chip DL          HPI: This patient is a 51 y.o. female, who presents for follow-up obstructive sleep apnea.     PSG indicated an AHI of 12.6 with a REM index of 42.4 with a low 02 of 78%. She was titrated to a CPAP pressure of 9 CM. compliance report over the past 30 days shows 100% use, average use of 9 hours per night, AHI of 2.3. She feels she benefits from therapy. She is unable to sleep without her machine. She has a good amount of energy during the day, denies a.m. headaches. Of recent she's been under a lot of stress, her insomnia has gotten worse. She falls asleep easily but typically wakes in the early morning hours and has a hard time getting back to sleep. She typically takes Belsomra 20 mg daily at bedtime with improvement. She tried Ambien in the past but had adverse reaction. I recommended referral to Dr. Love to discuss insomnia. She is amenable to this.      Past Medical History:   Diagnosis Date   • Anesthesia     Mother was difficult to awaken after anesthesia   • Arthritis 11-11-10    bilateral hands; L knee   • Dental disorder 1984    Mandible surgery   • Obstructive sleep apnea    • Other specified symptom associated with female genital organs     Unspecified STD   • Pain 05/21/14    abdomen where mesh is currently=2/10   • Sleep apnea     Uses CPAP   • Snoring    • Unspecified disorder of thyroid    • Unspecified urinary incontinence    • Urinary bladder disorder        Social History   Substance Use Topics   • Smoking status: Never Smoker   • Smokeless tobacco: Never Used   • Alcohol use No       Family History   Problem Relation Age of Onset   • Cancer Mother    • Diabetes Father    • Heart Disease Father    • Diabetes     • Heart Disease     • Hypertension     • Stroke     • Cancer     • Cancer Sister        Current medications as of today   Current Outpatient Prescriptions   Medication Sig Dispense Refill   • Suvorexant (BELSOMRA)  "20 MG Tab Take 1 Tab by mouth at bedtime as needed (for insomnia). Take 1 tablet by mouth at bedtime as needed for insomnia. 30 Tab 3   • BELVIQ 10 MG Tab TAKE 1 TABLET BY MOUTH TWICE DAILY 60 Tab 0   • hydrochlorothiazide (MICROZIDE) 12.5 MG capsule TAKE 1 CAPSULE BY MOUTH EVERY DAY 90 Cap 0   • diclofenac EC (VOLTAREN) 75 MG Tablet Delayed Response TAKE 1 TABLET BY MOUTH TWICE DAILY 180 Tab 1   • valacyclovir (VALTREX) 1 GM Tab TAKE 1 TABLET BY MOUTH EVERY MORNING 90 Tab 3   • SYNTHROID 112 MCG Tab Take 1 Tab by mouth Every morning on an empty stomach. 90 Tab 3   • duloxetine (CYMBALTA) 60 MG Cap DR Particles delayed-release capsule Take 1 Cap by mouth every day. 90 Cap 3   • metronidazole (METROGEL) 1 % gel 1 g PV nightly for 1 week 45 g 0     No current facility-administered medications for this visit.        Allergies: Iodine and Tape    Blood pressure 122/74, pulse 75, resp. rate 15, height 1.626 m (5' 4\"), weight (!) 141.1 kg (311 lb), SpO2 94 %.      ROS:   Constitutional: Denies fevers, chills, night sweats, weight loss or fatigue  Eyes: Denies pain, discharge/drainage  ENT: Denies tinnitus, hearing loss, sinusitis, hoarseness, epistaxis  Allergic: Denies Allergic rhinitis or hayfever  Respiratory: Denies cough, wheeze, dyspnea, hemoptysis  Cardiovascular: Denies chest pain, tightness, palpitations, orthopnea or edema  Sleep: See HPI  GI/: Denies heartburn, nausea, vomiting, urinary incontinence, hematuria  Musculoskeletal: Denies back pain, painful joints, sore muscles  Neurological: Denies vertigo or headaches  Skin: Denies rashes, lesions  Psychiatric: Denies depression or anxiety    Physical exam:   Constitutional:Obese, in no acute distress  Eyes: PERRLA  Neck: supple, no masses  Respiratory: no intercostal retractions or accessory muscle use   Lungs auscultation: Clear to auscultation bilaterally, diminished bilateral bases  Cardiovascular: Regular rate rhythm no murmurs, rubs or " gallops  Musculoskeletal: no clubbing or cyanosis  Skin: No rashes or lesions  Neuro: No focal deficit, cranial nerves grossly intact  Psychiatric: Oriented to time, person and place.     Diagnosis:  1. JOSE on CPAP  DME MASK AND SUPPLIES   2. Insomnia, unspecified type  REFERRAL TO OTHER    Suvorexant (BELSOMRA) 20 MG Tab       Plan:  1. Continue CPAP nightly  2. Clean mask and tubing weekly  3. Replace mask and supplies as insurance will allow  4. Rx for new supplies to [facility]  5. Rx for Belsomra 20mg qhs provided  6. Referral to Dr. Christina Love for chronic insomnia  7. Follow-up in 6 months, sooner if needed

## 2017-11-17 ENCOUNTER — HOSPITAL ENCOUNTER (OUTPATIENT)
Dept: LAB | Facility: MEDICAL CENTER | Age: 52
End: 2017-11-17
Attending: FAMILY MEDICINE
Payer: COMMERCIAL

## 2017-11-17 ENCOUNTER — OFFICE VISIT (OUTPATIENT)
Dept: MEDICAL GROUP | Facility: PHYSICIAN GROUP | Age: 52
End: 2017-11-17
Payer: COMMERCIAL

## 2017-11-17 VITALS
OXYGEN SATURATION: 95 % | WEIGHT: 293 LBS | TEMPERATURE: 97.1 F | HEART RATE: 80 BPM | DIASTOLIC BLOOD PRESSURE: 70 MMHG | RESPIRATION RATE: 14 BRPM | HEIGHT: 64 IN | BODY MASS INDEX: 50.02 KG/M2 | SYSTOLIC BLOOD PRESSURE: 126 MMHG

## 2017-11-17 DIAGNOSIS — D71 CHRONIC GRANULOMATOUS DISEASE (HCC): ICD-10-CM

## 2017-11-17 DIAGNOSIS — E66.01 OBESITY, MORBID, BMI 50 OR HIGHER (HCC): ICD-10-CM

## 2017-11-17 DIAGNOSIS — G47.33 OSA ON CPAP: ICD-10-CM

## 2017-11-17 DIAGNOSIS — G47.00 INSOMNIA, UNSPECIFIED TYPE: ICD-10-CM

## 2017-11-17 LAB
ALBUMIN SERPL BCP-MCNC: 4 G/DL (ref 3.2–4.9)
ALBUMIN/GLOB SERPL: 1.2 G/DL
ALP SERPL-CCNC: 112 U/L (ref 30–99)
ALT SERPL-CCNC: 40 U/L (ref 2–50)
ANION GAP SERPL CALC-SCNC: 9 MMOL/L (ref 0–11.9)
AST SERPL-CCNC: 41 U/L (ref 12–45)
BASOPHILS # BLD AUTO: 0.4 % (ref 0–1.8)
BASOPHILS # BLD: 0.02 K/UL (ref 0–0.12)
BILIRUB SERPL-MCNC: 0.5 MG/DL (ref 0.1–1.5)
BUN SERPL-MCNC: 13 MG/DL (ref 8–22)
CALCIUM SERPL-MCNC: 9.8 MG/DL (ref 8.5–10.5)
CHLORIDE SERPL-SCNC: 106 MMOL/L (ref 96–112)
CO2 SERPL-SCNC: 26 MMOL/L (ref 20–33)
CREAT SERPL-MCNC: 0.95 MG/DL (ref 0.5–1.4)
EOSINOPHIL # BLD AUTO: 0.29 K/UL (ref 0–0.51)
EOSINOPHIL NFR BLD: 5.9 % (ref 0–6.9)
ERYTHROCYTE [DISTWIDTH] IN BLOOD BY AUTOMATED COUNT: 46.6 FL (ref 35.9–50)
GFR SERPL CREATININE-BSD FRML MDRD: >60 ML/MIN/1.73 M 2
GLOBULIN SER CALC-MCNC: 3.3 G/DL (ref 1.9–3.5)
GLUCOSE SERPL-MCNC: 91 MG/DL (ref 65–99)
HCT VFR BLD AUTO: 44 % (ref 37–47)
HGB BLD-MCNC: 14.3 G/DL (ref 12–16)
IMM GRANULOCYTES # BLD AUTO: 0.01 K/UL (ref 0–0.11)
IMM GRANULOCYTES NFR BLD AUTO: 0.2 % (ref 0–0.9)
LYMPHOCYTES # BLD AUTO: 1.68 K/UL (ref 1–4.8)
LYMPHOCYTES NFR BLD: 34 % (ref 22–41)
MCH RBC QN AUTO: 32.1 PG (ref 27–33)
MCHC RBC AUTO-ENTMCNC: 32.5 G/DL (ref 33.6–35)
MCV RBC AUTO: 98.9 FL (ref 81.4–97.8)
MONOCYTES # BLD AUTO: 0.42 K/UL (ref 0–0.85)
MONOCYTES NFR BLD AUTO: 8.5 % (ref 0–13.4)
NEUTROPHILS # BLD AUTO: 2.52 K/UL (ref 2–7.15)
NEUTROPHILS NFR BLD: 51 % (ref 44–72)
NRBC # BLD AUTO: 0 K/UL
NRBC BLD AUTO-RTO: 0 /100 WBC
PLATELET # BLD AUTO: 234 K/UL (ref 164–446)
PMV BLD AUTO: 11.3 FL (ref 9–12.9)
POTASSIUM SERPL-SCNC: 4.3 MMOL/L (ref 3.6–5.5)
PROT SERPL-MCNC: 7.3 G/DL (ref 6–8.2)
RBC # BLD AUTO: 4.45 M/UL (ref 4.2–5.4)
SODIUM SERPL-SCNC: 141 MMOL/L (ref 135–145)
WBC # BLD AUTO: 4.9 K/UL (ref 4.8–10.8)

## 2017-11-17 PROCEDURE — 85025 COMPLETE CBC W/AUTO DIFF WBC: CPT

## 2017-11-17 PROCEDURE — 99214 OFFICE O/P EST MOD 30 MIN: CPT | Performed by: FAMILY MEDICINE

## 2017-11-17 PROCEDURE — 36415 COLL VENOUS BLD VENIPUNCTURE: CPT

## 2017-11-17 PROCEDURE — 80053 COMPREHEN METABOLIC PANEL: CPT

## 2017-11-17 RX ORDER — OMEPRAZOLE 20 MG/1
CAPSULE, DELAYED RELEASE ORAL
COMMUNITY
Start: 2017-11-02 | End: 2018-09-04

## 2017-11-17 RX ORDER — PHENTERMINE HYDROCHLORIDE 37.5 MG/1
37.5 CAPSULE ORAL EVERY MORNING
Qty: 30 CAP | Refills: 2 | Status: SHIPPED | OUTPATIENT
Start: 2017-11-17 | End: 2017-12-18 | Stop reason: SDUPTHER

## 2017-11-17 NOTE — PROGRESS NOTES
Chief Complaint   Patient presents with   • Follow-Up       HISTORY OF PRESENT ILLNESS: Patient is a 51 y.o. female established patient here today for the following concerns:    1. Obesity, morbid, BMI 50 or higher (CMS-HCC)  Here for follow up.  Has seen increasing weight again despite use of the Belviq.  She [facility] with [facility] but is unsure if she will be able to proceed with any weight loss surgery due to insurance coverage that will be up in the next month.  She requests trial of phentermine.      2. JOSE on CPAP  Continues on CPAP with great adherence and much improvement in symptoms.     3. Insomnia, unspecified type  Continues to struggle with insomnia, mostly staying asleep.  Her  has been up most of the nights which keeps her awake as well. Pulmonology has submitted referrral for sleep psychology.  Agree this is a great option. Continues on Belsomra.     4. Chronic granulomatous disease (HCC)  No active infections.  Denies fevers, chills.       Past Medical, Social, and Family history reviewed and updated in EPIC    Allergies:Iodine and Tape    Current Outpatient Prescriptions   Medication Sig Dispense Refill   • phentermine 37.5 MG capsule Take 1 Cap by mouth every morning. 30 Cap 2   • Suvorexant (BELSOMRA) 20 MG Tab Take 1 Tab by mouth at bedtime as needed (for insomnia). Take 1 tablet by mouth at bedtime as needed for insomnia. 30 Tab 3   • hydrochlorothiazide (MICROZIDE) 12.5 MG capsule TAKE 1 CAPSULE BY MOUTH EVERY DAY 90 Cap 0   • diclofenac EC (VOLTAREN) 75 MG Tablet Delayed Response TAKE 1 TABLET BY MOUTH TWICE DAILY 180 Tab 1   • valacyclovir (VALTREX) 1 GM Tab TAKE 1 TABLET BY MOUTH EVERY MORNING 90 Tab 3   • SYNTHROID 112 MCG Tab Take 1 Tab by mouth Every morning on an empty stomach. 90 Tab 3   • duloxetine (CYMBALTA) 60 MG Cap DR Particles delayed-release capsule Take 1 Cap by mouth every day. 90 Cap 3   • omeprazole (PRILOSEC) 20 MG delayed-release capsule        No  "current facility-administered medications for this visit.          ROS:  Review of Systems   Constitutional: Negative for fever, chills, weight loss and malaise/fatigue.   HENT: Negative for ear pain, nosebleeds, congestion, sore throat and neck pain.    Eyes: Negative for blurred vision.   Respiratory: Negative for cough, sputum production, shortness of breath and wheezing.    Cardiovascular: Negative for chest pain, palpitations,  and leg swelling.   Gastrointestinal: Negative for heartburn, nausea, vomiting, diarrhea and abdominal pain.   Genitourinary: Negative for dysuria, urgency and frequency.   Musculoskeletal: Negative for myalgias, back pain and joint pain.   Skin: Negative for rash and itching.   Neurological: Negative for dizziness, tingling, tremors, sensory change, focal weakness and headaches.   Endo/Heme/Allergies: Does not bruise/bleed easily.   Psychiatric/Behavioral: Negative for depression, anxiety, suicidal ideas, insomnia and memory loss.      Exam:  Blood pressure 126/70, pulse 80, temperature 36.2 °C (97.1 °F), resp. rate 14, height 1.626 m (5' 4.02\"), weight (!) 142.4 kg (314 lb), SpO2 95 %.    General:  Well nourished, well developed in NAD  Head is grossly normal.  Neck: Supple without JVD   Pulmonary:  Normal effort.   Cardiovascular: Regular rate  Extremities: no clubbing, cyanosis, or edema.  Psych: affect appropriate      Please note that this dictation was created using voice recognition software. I have made every reasonable attempt to correct obvious errors, but I expect that there are errors of grammar and possibly content that I did not discover before finalizing the note.    Assessment/Plan:  1. Obesity, morbid, BMI 50 or higher (CMS-HCC)  Start   - phentermine 37.5 MG capsule; Take 1 Cap by mouth every morning.  Dispense: 30 Cap; Refill: 2  - CBC WITH DIFFERENTIAL; Future  - COMP METABOLIC PANEL; Future    2. JOSE on CPAP  Continue CPAP  - CBC WITH DIFFERENTIAL; Future  - COMP " METABOLIC PANEL; Future    3. Insomnia, unspecified type  Continue Belsomra    4. Chronic granulomatous disease (HCC)  Continue observation, no active infections.     1 month follow up

## 2017-12-01 ENCOUNTER — TELEPHONE (OUTPATIENT)
Dept: SLEEP MEDICINE | Facility: MEDICAL CENTER | Age: 52
End: 2017-12-01

## 2017-12-01 ENCOUNTER — TELEPHONE (OUTPATIENT)
Dept: MEDICAL GROUP | Facility: PHYSICIAN GROUP | Age: 52
End: 2017-12-01

## 2017-12-01 ENCOUNTER — PATIENT MESSAGE (OUTPATIENT)
Dept: MEDICAL GROUP | Facility: PHYSICIAN GROUP | Age: 52
End: 2017-12-01

## 2017-12-01 NOTE — LETTER
December 1, 2017        Patient: Polly Menchaca   YOB: 1965   Date of Visit: 12/1/2017           Dear Dr. Delaney,       Thank you for caring for Polly Menchaca.   I see she will be undergoing surgery with you.  At this time her medical conditions are quite stable and I have no concern moving forward with general anesthesia.  She does have JOSE on CPAP, which would be the only precaution.      If you have any questions or concerns, please don't hesitate to call.        Sincerely,          Dorys Young M.D.  Electronically Signed    [address] [id number] [phone] (Phone)  [phone] (Fax)

## 2017-12-02 NOTE — TELEPHONE ENCOUNTER
Patient needs surgery clearance for shoulder surgery, she has already requested clearance from her PCP but Dr. Delaney at [facility] would like clearance from her sleep dr. Pt is on CPAP and was last seen 11/9/17. Clearance to Naty at [facility] fax: [phone]

## 2017-12-04 ENCOUNTER — TELEPHONE (OUTPATIENT)
Dept: PULMONOLOGY | Facility: HOSPICE | Age: 52
End: 2017-12-04

## 2017-12-04 NOTE — TELEPHONE ENCOUNTER
She is cleared from a sleep standpoint for needed upcoming surgery. She was last seen in the office 11/9/2017 with IRINEO Myrick. Compliance download at that time indicated excellent compliance with AHI reduced to 2.3. Encouraged to bring her CPAP with her the day of surgery.

## 2017-12-04 NOTE — TELEPHONE ENCOUNTER
Pt called requesting clearance for right shoulder surgery w/ Dr. Delaney at [facility], not sure when but is looking to have it done sometime this week and is waiting for clearance from us.  Not showing that she has had a recent CXR or PFT, please let me know if these are necessary and I will place the order.      Last OV: 11/9/17- Kim Vallecillo  Next OV: 5/10/18  JOSE Vallecillo is attached to this message for she saw the pt last.

## 2017-12-04 NOTE — TELEPHONE ENCOUNTER
Portia,    I spoke w/ the pt, gave her Kim Rodríguezkford's last message and she states that her shoulder surgery was approved by her insurance company and that she doesn't need pulmonary clearance after all.

## 2017-12-18 ENCOUNTER — OFFICE VISIT (OUTPATIENT)
Dept: MEDICAL GROUP | Facility: PHYSICIAN GROUP | Age: 52
End: 2017-12-18
Payer: COMMERCIAL

## 2017-12-18 VITALS
RESPIRATION RATE: 16 BRPM | WEIGHT: 293 LBS | HEART RATE: 82 BPM | DIASTOLIC BLOOD PRESSURE: 88 MMHG | HEIGHT: 64 IN | BODY MASS INDEX: 50.02 KG/M2 | SYSTOLIC BLOOD PRESSURE: 128 MMHG | OXYGEN SATURATION: 95 % | TEMPERATURE: 97.1 F

## 2017-12-18 DIAGNOSIS — G47.33 OSA ON CPAP: ICD-10-CM

## 2017-12-18 DIAGNOSIS — E66.01 OBESITY, MORBID, BMI 50 OR HIGHER (HCC): ICD-10-CM

## 2017-12-18 DIAGNOSIS — E03.9 ACQUIRED HYPOTHYROIDISM: ICD-10-CM

## 2017-12-18 DIAGNOSIS — Z12.11 SCREENING FOR COLON CANCER: ICD-10-CM

## 2017-12-18 PROCEDURE — 99214 OFFICE O/P EST MOD 30 MIN: CPT | Performed by: FAMILY MEDICINE

## 2017-12-18 RX ORDER — HYDROCODONE BITARTRATE AND ACETAMINOPHEN 10; 325 MG/1; MG/1
TABLET ORAL
Refills: 0 | COMMUNITY
Start: 2017-12-07

## 2017-12-18 RX ORDER — PHENTERMINE HYDROCHLORIDE 37.5 MG/1
37.5 CAPSULE ORAL EVERY MORNING
Qty: 90 CAP | Refills: 0 | Status: SHIPPED | OUTPATIENT
Start: 2017-12-18 | End: 2018-03-18

## 2017-12-18 NOTE — PROGRESS NOTES
Chief Complaint   Patient presents with   • Follow-Up       HISTORY OF PRESENT ILLNESS: Patient is a 52 y.o. female established patient here today for the following concerns:      Polly is here today for follow up.  She is now nearly 2 weeks post-op for shoulder arthroscopy with Dr. Delaney.  She reports she is doing quite well and will be started PT Wednesday.  She recently had labs which showed much improvement in her thyroid numbers.  She continues on phentermine for weight reduction with good improvement in weight loss (down nearly 10 lbs).  She reports good adherence with CPAP therapy.  Using the Belsomra to help with staying asleep.        Due for FIT testing.       Past Medical, Social, and Family history reviewed and updated in EPIC    Allergies:Iodine and Tape    Current Outpatient Prescriptions   Medication Sig Dispense Refill   • hydrocodone/acetaminophen (NORCO)  MG Tab TK 1 TO 2 TS PO Q 4 H PRN  0   • phentermine 37.5 MG capsule Take 1 Cap by mouth every morning for 90 days. 90 Cap 0   • omeprazole (PRILOSEC) 20 MG delayed-release capsule      • Suvorexant (BELSOMRA) 20 MG Tab Take 1 Tab by mouth at bedtime as needed (for insomnia). Take 1 tablet by mouth at bedtime as needed for insomnia. 30 Tab 3   • hydrochlorothiazide (MICROZIDE) 12.5 MG capsule TAKE 1 CAPSULE BY MOUTH EVERY DAY 90 Cap 0   • diclofenac EC (VOLTAREN) 75 MG Tablet Delayed Response TAKE 1 TABLET BY MOUTH TWICE DAILY 180 Tab 1   • valacyclovir (VALTREX) 1 GM Tab TAKE 1 TABLET BY MOUTH EVERY MORNING 90 Tab 3   • SYNTHROID 112 MCG Tab Take 1 Tab by mouth Every morning on an empty stomach. 90 Tab 3   • duloxetine (CYMBALTA) 60 MG Cap DR Particles delayed-release capsule Take 1 Cap by mouth every day. 90 Cap 3     No current facility-administered medications for this visit.          ROS:  Review of Systems   Constitutional: Negative for fever, chills, weight loss and malaise/fatigue.   HENT: Negative for ear pain, nosebleeds,  "congestion, sore throat and neck pain.    Eyes: Negative for blurred vision.   Respiratory: Negative for cough, sputum production, shortness of breath and wheezing.    Cardiovascular: Negative for chest pain, palpitations,  and leg swelling.   Gastrointestinal: Negative for heartburn, nausea, vomiting, diarrhea and abdominal pain.   Genitourinary: Negative for dysuria, urgency and frequency.   Musculoskeletal: Negative for myalgias, back pain and joint pain.   Skin: Negative for rash and itching.   Neurological: Negative for dizziness, tingling, tremors, sensory change, focal weakness and headaches.   Endo/Heme/Allergies: Does not bruise/bleed easily.   Psychiatric/Behavioral: Negative for depression, anxiety, suicidal ideas, insomnia and memory loss.      Exam:  Blood pressure 128/88, pulse 82, temperature 36.2 °C (97.1 °F), resp. rate 16, height 1.626 m (5' 4.02\"), weight (!) 139.7 kg (308 lb), SpO2 95 %.    General:  Well nourished, well developed in NAD  Head is grossly normal.  Neck: Supple without JVD   Pulmonary:  Normal effort.   Cardiovascular: Regular rate  Extremities: no clubbing, cyanosis, or edema.  Psych: affect appropriate  MSK: right shoulder in sling, incisions are clear dry and intact.     Please note that this dictation was created using voice recognition software. I have made every reasonable attempt to correct obvious errors, but I expect that there are errors of grammar and possibly content that I did not discover before finalizing the note.    Assessment/Plan:  1. Obesity, morbid, BMI 50 or higher (CMS-HCC)    With good improvement in weight, continue 3 month course  - phentermine 37.5 MG capsule; Take 1 Cap by mouth every morning for 90 days.  Dispense: 90 Cap; Refill: 0    2. JOSE on CPAP  Continue CPAP    3. Acquired hypothyroidism  Controlled, continue current dosing.     4. Screening for colon cancer  - OCCULT BLOOD FECES IMMUNOASSAY; Future    Return in about 3 months (around " 3/18/2018).

## 2018-01-02 RX ORDER — HYDROCHLOROTHIAZIDE 12.5 MG/1
CAPSULE, GELATIN COATED ORAL
Qty: 90 CAP | Refills: 1 | Status: SHIPPED | OUTPATIENT
Start: 2018-01-02 | End: 2018-10-07 | Stop reason: SDUPTHER

## 2018-01-02 NOTE — TELEPHONE ENCOUNTER
Was the patient seen in the last year in this department? Yes     Does patient have an active prescription for medications requested? No     Received Request Via: Pharmacy      Pt met protocol?: Yes, OV last month   BP Readings from Last 1 Encounters:   12/18/17 128/88

## 2018-01-03 NOTE — TELEPHONE ENCOUNTER
Refill X 6 months, sent to pharmacy.Pt. Seen in the last 6 months per protocol.   Lab Results   Component Value Date/Time    SODIUM 141 11/17/2017 07:34 AM    POTASSIUM 4.3 11/17/2017 07:34 AM    CHLORIDE 106 11/17/2017 07:34 AM    CO2 26 11/17/2017 07:34 AM    GLUCOSE 91 11/17/2017 07:34 AM    BUN 13 11/17/2017 07:34 AM    CREATININE 0.95 11/17/2017 07:34 AM    CREATININE 1.0 09/28/2007 08:07 AM

## 2018-01-18 ENCOUNTER — OFFICE VISIT (OUTPATIENT)
Dept: URGENT CARE | Facility: PHYSICIAN GROUP | Age: 53
End: 2018-01-18

## 2018-01-18 VITALS
OXYGEN SATURATION: 97 % | DIASTOLIC BLOOD PRESSURE: 90 MMHG | HEART RATE: 90 BPM | TEMPERATURE: 97.4 F | SYSTOLIC BLOOD PRESSURE: 130 MMHG | HEIGHT: 64 IN | BODY MASS INDEX: 50.02 KG/M2 | WEIGHT: 293 LBS | RESPIRATION RATE: 14 BRPM
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
## 2018-01-18 DIAGNOSIS — S51.852A DOG BITE OF LEFT FOREARM, INITIAL ENCOUNTER: ICD-10-CM

## 2018-01-18 PROCEDURE — 99214 OFFICE O/P EST MOD 30 MIN: CPT | Mod: 25 | Performed by: PHYSICIAN ASSISTANT

## 2018-01-18 PROCEDURE — 90715 TDAP VACCINE 7 YRS/> IM: CPT | Performed by: PHYSICIAN ASSISTANT

## 2018-01-18 PROCEDURE — 90471 IMMUNIZATION ADMIN: CPT | Performed by: PHYSICIAN ASSISTANT

## 2018-01-18 RX ORDER — AMOXICILLIN AND CLAVULANATE POTASSIUM 875; 125 MG/1; MG/1
1 TABLET, FILM COATED ORAL 2 TIMES DAILY
Qty: 20 TAB | Refills: 0 | Status: SHIPPED | OUTPATIENT
Start: 2018-01-18 | End: 2018-10-25

## 2018-01-18 NOTE — PROGRESS NOTES
Chief Complaint   Patient presents with   • Animal Bite     Pt was bit on the arm during a dog fight, multiple shallow wounds       HISTORY OF PRESENT ILLNESS: Patient is a 52 y.o. female who presents today for the following:    Dog bite x last night  Patient's dogs fighting, trying to break them up; bit by both dogs  Bigger dog lost both canines, found both of them  Left forearm - worsening redness, pain, swelling  No h/o diabetes   Denies fever, night sweats, and chills    Patient Active Problem List    Diagnosis Date Noted   • Positive JETT (antinuclear antibody) 05/26/2017   • Adult BMI 50.0-59.9 kg/sq m (CMS-[facility]) 01/12/2017   • History of cold sores 05/02/2016   • Osteoarthritis 09/12/2014   • Hypothyroidism 09/12/2014   • Insomnia 09/12/2014   • JOSE on CPAP 09/12/2014   • Chronic granulomatous disease (HCC) 09/12/2014   • Stress incontinence 05/27/2014       Allergies:Iodine and Tape    Current Outpatient Prescriptions Ordered in [facility]   Medication Sig Dispense Refill   • amoxicillin-clavulanate (AUGMENTIN) 875-125 MG Tab Take 1 Tab by mouth 2 times a day. 20 Tab 0   • hydrocodone/acetaminophen (NORCO)  MG Tab TK 1 TO 2 TS PO Q 4 H PRN  0   • omeprazole (PRILOSEC) 20 MG delayed-release capsule      • Suvorexant (BELSOMRA) 20 MG Tab Take 1 Tab by mouth at bedtime as needed (for insomnia). Take 1 tablet by mouth at bedtime as needed for insomnia. 30 Tab 3   • diclofenac EC (VOLTAREN) 75 MG Tablet Delayed Response TAKE 1 TABLET BY MOUTH TWICE DAILY 180 Tab 1   • valacyclovir (VALTREX) 1 GM Tab TAKE 1 TABLET BY MOUTH EVERY MORNING 90 Tab 3   • SYNTHROID 112 MCG Tab Take 1 Tab by mouth Every morning on an empty stomach. 90 Tab 3   • duloxetine (CYMBALTA) 60 MG Cap DR Particles delayed-release capsule Take 1 Cap by mouth every day. 90 Cap 3   • hydrochlorothiazide (MICROZIDE) 12.5 MG capsule TAKE 1 CAPSULE BY MOUTH EVERY DAY 90 Cap 1   • phentermine 37.5 MG capsule Take 1 Cap by mouth every morning for 90 days.  "90 Cap 0     No current Epic-ordered facility-administered medications on file.        Past Medical History:   Diagnosis Date   • Anesthesia     Mother was difficult to awaken after anesthesia   • Arthritis 11-11-10    bilateral hands; L knee   • Dental disorder 1984    Mandible surgery   • Obstructive sleep apnea    • Other specified symptom associated with female genital organs     Unspecified STD   • Pain 14    abdomen where mesh is currently=2/10   • Sleep apnea     Uses CPAP   • Snoring    • Unspecified disorder of thyroid    • Unspecified urinary incontinence    • Urinary bladder disorder        Social History   Substance Use Topics   • Smoking status: Never Smoker   • Smokeless tobacco: Never Used   • Alcohol use No       Family Status   Relation Status   • Mother    • Father    •     • Sister      Family History   Problem Relation Age of Onset   • Cancer Mother    • Diabetes Father    • Heart Disease Father    • Diabetes     • Heart Disease     • Hypertension     • Stroke     • Cancer     • Cancer Sister        ROS:   Review of Systems   Constitutional: Negative for fever, chills, weight loss and malaise/fatigue.   HENT: Negative for ear pain, nosebleeds, congestion, sore throat and neck pain.    Eyes: Negative for blurred vision.   Respiratory: Negative for cough, sputum production, shortness of breath and wheezing.    Cardiovascular: Negative for chest pain, palpitations, orthopnea and leg swelling.   Gastrointestinal: Negative for heartburn, nausea, vomiting and abdominal pain.   Genitourinary: Negative for dysuria, urgency and frequency.       Exam:  Blood pressure 130/90, pulse 90, temperature 36.3 °C (97.4 °F), resp. rate 14, height 1.626 m (5' 4\"), weight (!) 139.3 kg (307 lb), SpO2 97 %.  General: Well developed, well nourished. No distress.  HEENT: Head is grossly normal.  Pulmonary: No respiratory distress noted.  Neurologic: No sensory deficit noted.  Skin: 3 puncture wound " lateral left forearm; 8x5 cm erythema, tenderness. No induration. Serosanguinous drainage from the larger puncture.   Psych: Normal mood. Alert and oriented x3. Judgment and insight is normal.    tdap IM    Wound dressed    Assessment/Plan:  Discussed wound care home. Take all medications as directed. Follow-up for any worsening signs of infection as discussed in clinic.  1. Dog bite of left forearm, initial encounter  amoxicillin-clavulanate (AUGMENTIN) 875-125 MG Tab    Tdap =>6yo IM

## 2018-04-18 RX ORDER — DICLOFENAC SODIUM 75 MG/1
TABLET, DELAYED RELEASE ORAL
Qty: 180 TAB | Refills: 1 | Status: SHIPPED | OUTPATIENT
Start: 2018-04-18 | End: 2018-10-07 | Stop reason: SDUPTHER

## 2018-05-30 DIAGNOSIS — E03.9 ACQUIRED HYPOTHYROIDISM: ICD-10-CM

## 2018-05-31 RX ORDER — LEVOTHYROXINE SODIUM 112 MCG
TABLET ORAL
Qty: 90 TAB | Refills: 0 | Status: SHIPPED | OUTPATIENT
Start: 2018-05-31 | End: 2018-09-24 | Stop reason: SDUPTHER

## 2018-05-31 NOTE — TELEPHONE ENCOUNTER
Patient was last seen by PCP 12/17. Last TSH read 10/17 (0.52). Will refill for 3 months. Patient is due for an appointment. Please schedule.

## 2018-05-31 NOTE — TELEPHONE ENCOUNTER
Was the patient seen in the last year in this department? Yes     Does patient have an active prescription for medications requested? No     Received Request Via: Pharmacy      Pt met protocol?: No    LAST OV 12/18/2017      Lab Results  Component Value Date/Time   TSHULTRASEN 0.520 10/18/2017 0744

## 2018-06-12 DIAGNOSIS — E03.9 ACQUIRED HYPOTHYROIDISM: ICD-10-CM

## 2018-06-13 RX ORDER — LEVOTHYROXINE SODIUM 112 MCG
TABLET ORAL
Refills: 0 | OUTPATIENT
Start: 2018-06-13

## 2018-06-14 DIAGNOSIS — M25.50 POLYARTHRALGIA: ICD-10-CM

## 2018-06-15 DIAGNOSIS — E03.9 ACQUIRED HYPOTHYROIDISM: ICD-10-CM

## 2018-06-15 RX ORDER — LEVOTHYROXINE SODIUM 112 MCG
TABLET ORAL
Refills: 0 | OUTPATIENT
Start: 2018-06-15

## 2018-06-15 RX ORDER — DULOXETIN HYDROCHLORIDE 60 MG/1
CAPSULE, DELAYED RELEASE ORAL
Qty: 90 CAP | Refills: 0 | Status: SHIPPED | OUTPATIENT
Start: 2018-06-15 | End: 2018-09-25 | Stop reason: SDUPTHER

## 2018-09-01 DIAGNOSIS — R10.13 EPIGASTRIC ABDOMINAL PAIN: ICD-10-CM

## 2018-09-04 RX ORDER — VALACYCLOVIR HYDROCHLORIDE 1 G/1
TABLET, FILM COATED ORAL
Qty: 90 TAB | Refills: 0 | Status: SHIPPED | OUTPATIENT
Start: 2018-09-04 | End: 2019-01-07 | Stop reason: SDUPTHER

## 2018-09-04 RX ORDER — OMEPRAZOLE 20 MG/1
CAPSULE, DELAYED RELEASE ORAL
Qty: 90 CAP | Refills: 0 | Status: SHIPPED | OUTPATIENT
Start: 2018-09-04 | End: 2018-11-30 | Stop reason: SDUPTHER

## 2018-09-04 NOTE — TELEPHONE ENCOUNTER
Was the patient seen in the last year in this department? Yes    Does patient have an active prescription for medications requested? No     Received Request Via: Pharmacy      Pt met protocol?: Yes    OV 12/17

## 2018-09-21 ENCOUNTER — OFFICE VISIT (OUTPATIENT)
Dept: MEDICAL GROUP | Facility: PHYSICIAN GROUP | Age: 53
End: 2018-09-21
Payer: COMMERCIAL

## 2018-09-21 ENCOUNTER — HOSPITAL ENCOUNTER (OUTPATIENT)
Dept: LAB | Facility: MEDICAL CENTER | Age: 53
End: 2018-09-21
Attending: FAMILY MEDICINE
Payer: COMMERCIAL

## 2018-09-21 VITALS
WEIGHT: 293 LBS | HEIGHT: 64 IN | BODY MASS INDEX: 50.02 KG/M2 | DIASTOLIC BLOOD PRESSURE: 88 MMHG | OXYGEN SATURATION: 97 % | RESPIRATION RATE: 18 BRPM | TEMPERATURE: 96.9 F | SYSTOLIC BLOOD PRESSURE: 134 MMHG | HEART RATE: 78 BPM

## 2018-09-21 DIAGNOSIS — Z12.11 SCREENING FOR COLON CANCER: ICD-10-CM

## 2018-09-21 DIAGNOSIS — D71 CHRONIC GRANULOMATOUS DISEASE (HCC): ICD-10-CM

## 2018-09-21 DIAGNOSIS — Z23 NEED FOR VACCINATION: ICD-10-CM

## 2018-09-21 DIAGNOSIS — Z13.6 SCREENING FOR CARDIOVASCULAR CONDITION: ICD-10-CM

## 2018-09-21 DIAGNOSIS — Z13.29 SCREENING FOR ENDOCRINE DISORDER: ICD-10-CM

## 2018-09-21 DIAGNOSIS — Z91.89 ENCOUNTER FOR HEPATITIS C VIRUS SCREENING TEST FOR HIGH RISK PATIENT: ICD-10-CM

## 2018-09-21 DIAGNOSIS — Z11.59 ENCOUNTER FOR HEPATITIS C VIRUS SCREENING TEST FOR HIGH RISK PATIENT: ICD-10-CM

## 2018-09-21 DIAGNOSIS — G47.33 OSA ON CPAP: ICD-10-CM

## 2018-09-21 DIAGNOSIS — G47.00 INSOMNIA, UNSPECIFIED TYPE: ICD-10-CM

## 2018-09-21 DIAGNOSIS — Z12.39 SCREENING FOR BREAST CANCER: ICD-10-CM

## 2018-09-21 DIAGNOSIS — E03.9 ACQUIRED HYPOTHYROIDISM: ICD-10-CM

## 2018-09-21 LAB
25(OH)D3 SERPL-MCNC: 21 NG/ML (ref 30–100)
ALBUMIN SERPL BCP-MCNC: 4.5 G/DL (ref 3.2–4.9)
ALBUMIN/GLOB SERPL: 1.3 G/DL
ALP SERPL-CCNC: 122 U/L (ref 30–99)
ALT SERPL-CCNC: 37 U/L (ref 2–50)
ANION GAP SERPL CALC-SCNC: 9 MMOL/L (ref 0–11.9)
AST SERPL-CCNC: 38 U/L (ref 12–45)
BASOPHILS # BLD AUTO: 0.4 % (ref 0–1.8)
BASOPHILS # BLD: 0.02 K/UL (ref 0–0.12)
BILIRUB SERPL-MCNC: 0.6 MG/DL (ref 0.1–1.5)
BUN SERPL-MCNC: 15 MG/DL (ref 8–22)
CALCIUM SERPL-MCNC: 9.9 MG/DL (ref 8.5–10.5)
CHLORIDE SERPL-SCNC: 104 MMOL/L (ref 96–112)
CHOLEST SERPL-MCNC: 204 MG/DL (ref 100–199)
CO2 SERPL-SCNC: 30 MMOL/L (ref 20–33)
CREAT SERPL-MCNC: 1 MG/DL (ref 0.5–1.4)
CREAT UR-MCNC: 146.2 MG/DL
EOSINOPHIL # BLD AUTO: 0.08 K/UL (ref 0–0.51)
EOSINOPHIL NFR BLD: 1.6 % (ref 0–6.9)
ERYTHROCYTE [DISTWIDTH] IN BLOOD BY AUTOMATED COUNT: 45.1 FL (ref 35.9–50)
EST. AVERAGE GLUCOSE BLD GHB EST-MCNC: 105 MG/DL
FASTING STATUS PATIENT QL REPORTED: NORMAL
GLOBULIN SER CALC-MCNC: 3.6 G/DL (ref 1.9–3.5)
GLUCOSE SERPL-MCNC: 82 MG/DL (ref 65–99)
HBA1C MFR BLD: 5.3 % (ref 0–5.6)
HCT VFR BLD AUTO: 45.8 % (ref 37–47)
HCV AB SER QL: NEGATIVE
HDLC SERPL-MCNC: 50 MG/DL
HGB BLD-MCNC: 14.9 G/DL (ref 12–16)
IMM GRANULOCYTES # BLD AUTO: 0.01 K/UL (ref 0–0.11)
IMM GRANULOCYTES NFR BLD AUTO: 0.2 % (ref 0–0.9)
LDLC SERPL CALC-MCNC: 121 MG/DL
LYMPHOCYTES # BLD AUTO: 1.49 K/UL (ref 1–4.8)
LYMPHOCYTES NFR BLD: 30 % (ref 22–41)
MCH RBC QN AUTO: 30.9 PG (ref 27–33)
MCHC RBC AUTO-ENTMCNC: 32.5 G/DL (ref 33.6–35)
MCV RBC AUTO: 95 FL (ref 81.4–97.8)
MICROALBUMIN UR-MCNC: 1.4 MG/DL
MICROALBUMIN/CREAT UR: 10 MG/G (ref 0–30)
MONOCYTES # BLD AUTO: 0.32 K/UL (ref 0–0.85)
MONOCYTES NFR BLD AUTO: 6.5 % (ref 0–13.4)
NEUTROPHILS # BLD AUTO: 3.04 K/UL (ref 2–7.15)
NEUTROPHILS NFR BLD: 61.3 % (ref 44–72)
NRBC # BLD AUTO: 0 K/UL
NRBC BLD-RTO: 0 /100 WBC
PLATELET # BLD AUTO: 260 K/UL (ref 164–446)
PMV BLD AUTO: 11.4 FL (ref 9–12.9)
POTASSIUM SERPL-SCNC: 3.9 MMOL/L (ref 3.6–5.5)
PROT SERPL-MCNC: 8.1 G/DL (ref 6–8.2)
RBC # BLD AUTO: 4.82 M/UL (ref 4.2–5.4)
SODIUM SERPL-SCNC: 143 MMOL/L (ref 135–145)
TRIGL SERPL-MCNC: 164 MG/DL (ref 0–149)
TSH SERPL DL<=0.005 MIU/L-ACNC: 0.65 UIU/ML (ref 0.38–5.33)
WBC # BLD AUTO: 5 K/UL (ref 4.8–10.8)

## 2018-09-21 PROCEDURE — 80061 LIPID PANEL: CPT

## 2018-09-21 PROCEDURE — 82043 UR ALBUMIN QUANTITATIVE: CPT

## 2018-09-21 PROCEDURE — 86803 HEPATITIS C AB TEST: CPT

## 2018-09-21 PROCEDURE — 85025 COMPLETE CBC W/AUTO DIFF WBC: CPT

## 2018-09-21 PROCEDURE — 83036 HEMOGLOBIN GLYCOSYLATED A1C: CPT

## 2018-09-21 PROCEDURE — 82306 VITAMIN D 25 HYDROXY: CPT

## 2018-09-21 PROCEDURE — 84443 ASSAY THYROID STIM HORMONE: CPT

## 2018-09-21 PROCEDURE — 90686 IIV4 VACC NO PRSV 0.5 ML IM: CPT | Performed by: FAMILY MEDICINE

## 2018-09-21 PROCEDURE — 80053 COMPREHEN METABOLIC PANEL: CPT

## 2018-09-21 PROCEDURE — 82570 ASSAY OF URINE CREATININE: CPT

## 2018-09-21 PROCEDURE — 99214 OFFICE O/P EST MOD 30 MIN: CPT | Mod: 25 | Performed by: FAMILY MEDICINE

## 2018-09-21 PROCEDURE — 36415 COLL VENOUS BLD VENIPUNCTURE: CPT

## 2018-09-21 PROCEDURE — 90471 IMMUNIZATION ADMIN: CPT | Performed by: FAMILY MEDICINE

## 2018-09-21 NOTE — PROGRESS NOTES
Chief Complaint   Patient presents with   • Insomnia     fv med   • Labs Only       HISTORY OF PRESENT ILLNESS: Patient is a 52 y.o. female established patient here today for the following concerns:    1. Need for vaccination  Due for flu vaccine.      2. JOSE on CPAP  Continues on CPAP therapy.    3. Insomnia, unspecified type  Still has persist insomnia, now that she is insured again needs new Rx for Belsomra which was very effective over use of Ambien, Trazodone.      4. Acquired hypothyroidism  Continues on thyroid replacement.  Due for repeat labs.  Having dry skin, fatigue, hot flashes.      5. Chronic granulomatous disease (HCC)  No additional symptoms at this time.  Due for repeat CBC.  No new respiratory symptoms.  Does have persistent joint pains.     6. Screening for cardiovascular condition  7. Screening for endocrine disorder  8. Adult BMI 50.0-59.9 kg/sq m (HCC)  9. Encounter for hepatitis C virus screening test for high risk patient  Due for screening labs.     10. Screening for breast cancer  Due for mammogram    11. Screening for colon cancer  Due for FIT, declines colonoscopy      Past Medical, Social, and Family history reviewed and updated in EPIC    Allergies:Iodine and Tape    Current Outpatient Prescriptions   Medication Sig Dispense Refill   • Suvorexant (BELSOMRA) 20 MG Tab Take 1 Tab by mouth at bedtime as needed (for insomnia) for up to 90 days. Take 1 tablet by mouth at bedtime as needed for insomnia. 90 Tab 0   • valacyclovir (VALTREX) 1 GM Tab TAKE 1 TABLET BY MOUTH EVERY MORNING 90 Tab 0   • omeprazole (PRILOSEC) 20 MG delayed-release capsule TAKE 1 CAPSULE BY MOUTH EVERY DAY 90 Cap 0   • DULoxetine (CYMBALTA) 60 MG Cap DR Particles delayed-release capsule TAKE 1 CAPSULE BY MOUTH EVERY DAY 90 Cap 0   • SYNTHROID 112 MCG Tab TAKE 1 TABLET BY MOUTH EVERY MORNING ON AN EMPTY STOMACH 90 Tab 0   • diclofenac EC (VOLTAREN) 75 MG Tablet Delayed Response TAKE 1 TABLET BY MOUTH TWICE DAILY 180  "Tab 1   • hydrochlorothiazide (MICROZIDE) 12.5 MG capsule TAKE 1 CAPSULE BY MOUTH EVERY DAY 90 Cap 1   • hydrocodone/acetaminophen (NORCO)  MG Tab TK 1 TO 2 TS PO Q 4 H PRN  0   • Suvorexant (BELSOMRA) 20 MG Tab Take 1 Tab by mouth at bedtime as needed (for insomnia). Take 1 tablet by mouth at bedtime as needed for insomnia. 30 Tab 3   • amoxicillin-clavulanate (AUGMENTIN) 875-125 MG Tab Take 1 Tab by mouth 2 times a day. 20 Tab 0     No current facility-administered medications for this visit.          ROS:  Review of Systems   Constitutional: Negative for fever, chills, weight loss and malaise/fatigue.   HENT: Negative for ear pain, nosebleeds, congestion, sore throat and neck pain.    Eyes: Negative for blurred vision.   Respiratory: Negative for cough, sputum production, shortness of breath and wheezing.    Cardiovascular: Negative for chest pain, palpitations,  and leg swelling.   Gastrointestinal: Negative for heartburn, nausea, vomiting, diarrhea and abdominal pain.   Genitourinary: Negative for dysuria, urgency and frequency.   Musculoskeletal: Negative for myalgias, back pain and joint pain.   Skin: Negative for rash and itching.   Neurological: Negative for dizziness, tingling, tremors, sensory change, focal weakness and headaches.   Endo/Heme/Allergies: Does not bruise/bleed easily.   Psychiatric/Behavioral: Negative for depression, anxiety, suicidal ideas, insomnia and memory loss.      Exam:  Blood pressure 134/88, pulse 78, temperature 36.1 °C (96.9 °F), temperature source Temporal, resp. rate 18, height 1.626 m (5' 4\"), weight (!) 137.4 kg (303 lb), SpO2 97 %.    General:  Well nourished, well developed in NAD  Head is grossly normal.  Neck: Supple without JVD   Pulmonary:  Normal effort.   Cardiovascular: Regular rate  Extremities: no clubbing, cyanosis, or edema.  Psych: affect appropriate      Please note that this dictation was created using voice recognition software. I have made every " "reasonable attempt to correct obvious errors, but I expect that there are errors of grammar and possibly content that I did not discover before finalizing the note.    Assessment/Plan:  1. Need for vaccination  - Influenza Vaccine Quad Injection >3Y (PF)    2. JOSE on CPAP  - Suvorexant (BELSOMRA) 20 MG Tab; Take 1 Tab by mouth at bedtime as needed (for insomnia) for up to 90 days. Take 1 tablet by mouth at bedtime as needed for insomnia.  Dispense: 90 Tab; Refill: 0    3. Insomnia, unspecified type  - Suvorexant (BELSOMRA) 20 MG Tab; Take 1 Tab by mouth at bedtime as needed (for insomnia) for up to 90 days. Take 1 tablet by mouth at bedtime as needed for insomnia.  Dispense: 90 Tab; Refill: 0    4. Acquired hypothyroidism  - TSH WITH REFLEX TO FT4; Future    5. Chronic granulomatous disease (HCC)  - CBC WITH DIFFERENTIAL; Future    6. Screening for cardiovascular condition  - LIPID PROFILE; Future  - COMP METABOLIC PANEL; Future    7. Screening for endocrine disorder  - HEMOGLOBIN A1C; Future  - MICROALBUMIN CREAT RATIO URINE; Future  - TSH WITH REFLEX TO FT4; Future  - VITAMIN D,25 HYDROXY; Future    8. Adult BMI 50.0-59.9 kg/sq m ([facility])  You have been identified as having a Body Mass Index over 30, which is characterized as \"obese\".  Focus on consuming a dietary pattern that emphasizes intake of vegetables, fruits, and whole grains; includes low-fat dairy products, poultry, fish, legumes, nontropical vegetable oils and nuts; and limits intake of sweets, sugar-sweetened beverages and red meats.   Look up Mediterranean diet for more information.      9. Encounter for hepatitis C virus screening test for high risk patient  - HEP C VIRUS ANTIBODY; Future    10. Screening for breast cancer  - MA-SCREEN MAMMO W/CAD-BILAT; Future    11. Screening for colon cancer  - OCCULT BLOOD, FECAL IMMUNOASSAY    1 month follow up for labs.           "

## 2018-09-24 DIAGNOSIS — E03.9 ACQUIRED HYPOTHYROIDISM: ICD-10-CM

## 2018-09-25 DIAGNOSIS — E03.9 ACQUIRED HYPOTHYROIDISM: ICD-10-CM

## 2018-09-25 DIAGNOSIS — M25.50 POLYARTHRALGIA: ICD-10-CM

## 2018-09-25 NOTE — TELEPHONE ENCOUNTER
Was the patient seen in the last year in this department? Yes    Does patient have an active prescription for medications requested? No     Received Request Via: Pharmacy      Pt met protocol?: Yes, last ov 9/21/18  Last labs 9/21/18  Last TSH 9/21/18

## 2018-09-26 RX ORDER — LEVOTHYROXINE SODIUM 112 MCG
TABLET ORAL
Qty: 90 TAB | Refills: 3 | Status: SHIPPED | OUTPATIENT
Start: 2018-09-26 | End: 2019-02-15 | Stop reason: SDUPTHER

## 2018-09-26 RX ORDER — DULOXETIN HYDROCHLORIDE 60 MG/1
CAPSULE, DELAYED RELEASE ORAL
Qty: 90 CAP | Refills: 3 | Status: SHIPPED | OUTPATIENT
Start: 2018-09-26 | End: 2019-10-05 | Stop reason: SDUPTHER

## 2018-09-26 RX ORDER — LEVOTHYROXINE SODIUM 112 MCG
TABLET ORAL
Qty: 90 TAB | Refills: 3 | Status: SHIPPED | OUTPATIENT
Start: 2018-09-26

## 2018-10-08 RX ORDER — HYDROCHLOROTHIAZIDE 12.5 MG/1
CAPSULE, GELATIN COATED ORAL
Qty: 90 CAP | Refills: 0 | Status: SHIPPED | OUTPATIENT
Start: 2018-10-08 | End: 2018-12-25 | Stop reason: SDUPTHER

## 2018-10-08 RX ORDER — DICLOFENAC SODIUM 75 MG/1
TABLET, DELAYED RELEASE ORAL
Qty: 180 TAB | Refills: 0 | Status: SHIPPED | OUTPATIENT
Start: 2018-10-08 | End: 2018-12-25 | Stop reason: SDUPTHER

## 2018-10-08 NOTE — TELEPHONE ENCOUNTER
Was the patient seen in the last year in this department? Yes    Does patient have an active prescription for medications requested? No     Received Request Via: Pharmacy      Pt met protocol?: Yes    OV 9/18     BP Readings from Last 1 Encounters:   09/21/18 134/88

## 2018-10-15 ENCOUNTER — TELEPHONE (OUTPATIENT)
Dept: MEDICAL GROUP | Facility: PHYSICIAN GROUP | Age: 53
End: 2018-10-15

## 2018-10-15 NOTE — TELEPHONE ENCOUNTER
FINAL PRIOR AUTHORIZATION STATUS:    1.  Name of Medication & Dose: Belsomra 20 mg     2. Prior Auth Status: Approved through 10/15/2019     3. Action Taken: Pharmacy Notified: yes Patient Notified: no

## 2018-10-25 ENCOUNTER — OFFICE VISIT (OUTPATIENT)
Dept: MEDICAL GROUP | Facility: PHYSICIAN GROUP | Age: 53
End: 2018-10-25
Payer: COMMERCIAL

## 2018-10-25 VITALS
HEIGHT: 64 IN | BODY MASS INDEX: 50.02 KG/M2 | RESPIRATION RATE: 18 BRPM | HEART RATE: 92 BPM | OXYGEN SATURATION: 95 % | WEIGHT: 293 LBS | SYSTOLIC BLOOD PRESSURE: 118 MMHG | TEMPERATURE: 97.1 F | DIASTOLIC BLOOD PRESSURE: 84 MMHG

## 2018-10-25 DIAGNOSIS — R23.0 PERIORAL CYANOSIS: ICD-10-CM

## 2018-10-25 DIAGNOSIS — R09.02 HYPOXIA: ICD-10-CM

## 2018-10-25 DIAGNOSIS — N28.9 MILD RENAL INSUFFICIENCY: ICD-10-CM

## 2018-10-25 DIAGNOSIS — G47.33 OSA ON CPAP: ICD-10-CM

## 2018-10-25 DIAGNOSIS — R74.8 ALKALINE PHOSPHATASE ELEVATION: ICD-10-CM

## 2018-10-25 DIAGNOSIS — E55.9 VITAMIN D DEFICIENCY: ICD-10-CM

## 2018-10-25 PROCEDURE — 99214 OFFICE O/P EST MOD 30 MIN: CPT | Performed by: FAMILY MEDICINE

## 2018-10-25 RX ORDER — ERGOCALCIFEROL 1.25 MG/1
50000 CAPSULE ORAL
Qty: 12 CAP | Refills: 2 | Status: SHIPPED | OUTPATIENT
Start: 2018-10-25

## 2018-10-25 RX ORDER — PHENTERMINE HYDROCHLORIDE 37.5 MG/1
37.5 TABLET ORAL
Qty: 90 TAB | Refills: 0 | Status: SHIPPED | OUTPATIENT
Start: 2018-10-25 | End: 2018-12-13 | Stop reason: SDUPTHER

## 2018-10-25 ASSESSMENT — PATIENT HEALTH QUESTIONNAIRE - PHQ9: CLINICAL INTERPRETATION OF PHQ2 SCORE: 0

## 2018-10-25 NOTE — PROGRESS NOTES
Chief Complaint   Patient presents with   • Results     labs   • Obesity     phentermine       HISTORY OF PRESENT ILLNESS: Patient is a 52 y.o. female established patient here today for the following concerns:    Here for lab follow up:      1. Alkaline phosphatase elevation  Noted to have elevated ALk phos.  Has had belly pains but usually cramping LUQ unassociated with food.  Previous cholecystectomy.  No jaundice.  Remainder LFTs normal.      2. Mild renal insufficiency  Noted on labs.  Reports good hydration efforts.  No new medications.  No dysuria.      3. Vitamin D deficiency  Noted again to be low.  Despite adequate OTC replacement we have not been successful at increasing her levels.     4. JOSE on CPAP  5. Perioral cyanosis  6. Hypoxia      Continues on CPAP without problem.  She notes that at times she wakes with blue lips and SOB.  Feels like she may not be getting enough air.  She is not on O2 bleed in.         7. Adult BMI 50.0-59.9 kg/sq m (HCC)  Weight reduction efforts.  Hoping to try phentermine, as she has had some help with this in the past without side effect.  Is trying to limit portion sizes at this time, limited physical activity.       Past Medical, Social, and Family history reviewed and updated in EPIC    Allergies:Iodine and Tape    Current Outpatient Prescriptions   Medication Sig Dispense Refill   • ergocalciferol (DRISDOL) 97937 UNIT capsule Take 1 Cap by mouth every 7 days. 12 Cap 2   • phentermine (ADIPEX-P) 37.5 MG tablet Take 1 Tab by mouth every morning before breakfast for 90 days. 90 Tab 0   • hydrochlorothiazide (MICROZIDE) 12.5 MG capsule TAKE 1 CAPSULE BY MOUTH EVERY DAY 90 Cap 0   • diclofenac EC (VOLTAREN) 75 MG Tablet Delayed Response TAKE 1 TABLET BY MOUTH TWICE DAILY 180 Tab 0   • SYNTHROID 112 MCG Tab TAKE 1 TABLET BY MOUTH EVERY DAY ON AN EMPTY STOMACH 90 Tab 3   • DULoxetine (CYMBALTA) 60 MG Cap DR Particles delayed-release capsule TAKE 1 CAPSULE BY MOUTH EVERY DAY 90  "Cap 3   • SYNTHROID 112 MCG Tab TAKE 1 TABLET BY MOUTH EVERY DAY ON AN EMPTY STOMACH 90 Tab 3   • omeprazole (PRILOSEC) 20 MG delayed-release capsule TAKE 1 CAPSULE BY MOUTH EVERY DAY 90 Cap 0   • Suvorexant (BELSOMRA) 20 MG Tab Take 1 Tab by mouth at bedtime as needed (for insomnia). Take 1 tablet by mouth at bedtime as needed for insomnia. 30 Tab 3   • valacyclovir (VALTREX) 1 GM Tab TAKE 1 TABLET BY MOUTH EVERY MORNING 90 Tab 0   • hydrocodone/acetaminophen (NORCO)  MG Tab TK 1 TO 2 TS PO Q 4 H PRN  0     No current facility-administered medications for this visit.          ROS:  Review of Systems   Constitutional: Negative for fever, chills, weight loss and malaise/fatigue.   HENT: Negative for ear pain, nosebleeds, congestion, sore throat and neck pain.    Eyes: Negative for blurred vision.   Respiratory: Negative for cough, sputum production, shortness of breath and wheezing.    Cardiovascular: Negative for chest pain, palpitations,  and leg swelling.   Gastrointestinal: Negative for heartburn, nausea, vomiting, diarrhea and abdominal pain.   Genitourinary: Negative for dysuria, urgency and frequency.   Musculoskeletal: Negative for myalgias, back pain and joint pain.   Skin: Negative for rash and itching.   Neurological: Negative for dizziness, tingling, tremors, sensory change, focal weakness and headaches.   Endo/Heme/Allergies: Does not bruise/bleed easily.   Psychiatric/Behavioral: Negative for depression, anxiety, suicidal ideas, insomnia and memory loss.      Exam:  Blood pressure 118/84, pulse 92, temperature 36.2 °C (97.1 °F), resp. rate 18, height 1.626 m (5' 4\"), weight (!) 136.5 kg (301 lb), SpO2 95 %.    General:  Well nourished, well developed in NAD  Head is grossly normal.  Neck: Supple without JVD   Pulmonary:  Normal effort.   Cardiovascular: Regular rate  Extremities: no clubbing, cyanosis, or edema.  Psych: affect appropriate      Please note that this dictation was created using " voice recognition software. I have made every reasonable attempt to correct obvious errors, but I expect that there are errors of grammar and possibly content that I did not discover before finalizing the note.    Assessment/Plan:  1. Alkaline phosphatase elevation  Check in 3 months.   - COMP METABOLIC PANEL; Future  - GAMMA GT (GGT); Future    2. Mild renal insufficiency  Continue hydration, will try to limit NSAIDS  - COMP METABOLIC PANEL; Future    3. Vitamin D deficiency  Start   - ergocalciferol (DRISDOL) 50107 UNIT capsule; Take 1 Cap by mouth every 7 days.  Dispense: 12 Cap; Refill: 2    4. JOSE on CPAP  Continue CPAP    5. Perioral cyanosis  OPO to r/o persistent hypoxia    6. Hypoxia  As above.    7. Adult BMI 50.0-59.9 kg/sq m (HCC)    - phentermine (ADIPEX-P) 37.5 MG tablet; Take 1 Tab by mouth every morning before breakfast for 90 days.  Dispense: 90 Tab; Refill: 0    3 month follow up

## 2018-11-14 ENCOUNTER — HOSPITAL ENCOUNTER (OUTPATIENT)
Facility: MEDICAL CENTER | Age: 53
End: 2018-11-14
Attending: FAMILY MEDICINE
Payer: COMMERCIAL

## 2018-11-14 PROCEDURE — 82274 ASSAY TEST FOR BLOOD FECAL: CPT

## 2018-11-19 DIAGNOSIS — Z12.11 SCREENING FOR COLON CANCER: ICD-10-CM

## 2018-11-19 LAB — AMBIGUOUS DTTM AMBI4: NORMAL

## 2018-11-20 LAB — HEMOCCULT STL QL IA: NEGATIVE

## 2018-11-27 ENCOUNTER — OFFICE VISIT (OUTPATIENT)
Dept: MEDICAL GROUP | Facility: PHYSICIAN GROUP | Age: 53
End: 2018-11-27
Payer: COMMERCIAL

## 2018-11-27 VITALS
TEMPERATURE: 97.4 F | HEIGHT: 64 IN | SYSTOLIC BLOOD PRESSURE: 142 MMHG | BODY MASS INDEX: 50.02 KG/M2 | OXYGEN SATURATION: 98 % | WEIGHT: 293 LBS | RESPIRATION RATE: 16 BRPM | DIASTOLIC BLOOD PRESSURE: 80 MMHG | HEART RATE: 80 BPM

## 2018-11-27 DIAGNOSIS — L70.0 CYSTIC ACNE: ICD-10-CM

## 2018-11-27 DIAGNOSIS — G47.33 OSA ON CPAP: ICD-10-CM

## 2018-11-27 DIAGNOSIS — R25.2 LEG CRAMPS: ICD-10-CM

## 2018-11-27 DIAGNOSIS — G47.00 INSOMNIA, UNSPECIFIED TYPE: ICD-10-CM

## 2018-11-27 PROCEDURE — 99214 OFFICE O/P EST MOD 30 MIN: CPT | Performed by: FAMILY MEDICINE

## 2018-11-27 RX ORDER — DOXYCYCLINE HYCLATE 100 MG
100 TABLET ORAL 2 TIMES DAILY
Qty: 14 TAB | Refills: 0 | Status: SHIPPED | OUTPATIENT
Start: 2018-11-27 | End: 2018-12-04

## 2018-11-27 NOTE — PROGRESS NOTES
Chief Complaint   Patient presents with   • Other     JOSE fv    • Insomnia     belsomra       HISTORY OF PRESENT ILLNESS: Patient is a 53 y.o. female established patient here today for the following concerns:    1. JOSE on CPAP  Continues on CPAP therapy.  Has had OPO on CPAP with no resulting hypoxia or need for bleed in oxygen.  Has had some difficulty with mask fit.      2. Insomnia, unspecified type  Continues on belsomra for help with sleep with JOSE.  No adverse effects.  Needs refill.      3. Leg cramps  Has been experiencing bilateral leg cramps in the evening in the feet and toes.      4. BMI 50.0-59.9, adult ([facility])  Continues to work on weight loss.  Has been using the phentermine with improvement of appetite, but reports that she will reach for cookies or sweet treats in between.    5. Cystic acne  This last week has had increase in acne lesions on the face.     Past Medical, Social, and Family history reviewed and updated in EPIC    Allergies:Iodine and Tape    Current Outpatient Prescriptions   Medication Sig Dispense Refill   • Suvorexant (BELSOMRA) 20 MG Tab Take 1 Tab by mouth at bedtime as needed (for insomnia) for up to 90 days. Take 1 tablet by mouth at bedtime as needed for insomnia. 90 Tab 3   • ergocalciferol (DRISDOL) 85036 UNIT capsule Take 1 Cap by mouth every 7 days. 12 Cap 2   • phentermine (ADIPEX-P) 37.5 MG tablet Take 1 Tab by mouth every morning before breakfast for 90 days. 90 Tab 0   • hydrochlorothiazide (MICROZIDE) 12.5 MG capsule TAKE 1 CAPSULE BY MOUTH EVERY DAY 90 Cap 0   • diclofenac EC (VOLTAREN) 75 MG Tablet Delayed Response TAKE 1 TABLET BY MOUTH TWICE DAILY 180 Tab 0   • SYNTHROID 112 MCG Tab TAKE 1 TABLET BY MOUTH EVERY DAY ON AN EMPTY STOMACH 90 Tab 3   • DULoxetine (CYMBALTA) 60 MG Cap DR Particles delayed-release capsule TAKE 1 CAPSULE BY MOUTH EVERY DAY 90 Cap 3   • SYNTHROID 112 MCG Tab TAKE 1 TABLET BY MOUTH EVERY DAY ON AN EMPTY STOMACH 90 Tab 3   • valacyclovir  "(VALTREX) 1 GM Tab TAKE 1 TABLET BY MOUTH EVERY MORNING 90 Tab 0   • omeprazole (PRILOSEC) 20 MG delayed-release capsule TAKE 1 CAPSULE BY MOUTH EVERY DAY 90 Cap 0   • hydrocodone/acetaminophen (NORCO)  MG Tab TK 1 TO 2 TS PO Q 4 H PRN  0     No current facility-administered medications for this visit.          ROS:  Review of Systems   Constitutional: Negative for fever, chills, weight loss and malaise/fatigue.   HENT: Negative for ear pain, nosebleeds, congestion, sore throat and neck pain.    Eyes: Negative for blurred vision.   Respiratory: Negative for cough, sputum production, shortness of breath and wheezing.    Cardiovascular: Negative for chest pain, palpitations,  and leg swelling.   Gastrointestinal: Negative for heartburn, nausea, vomiting, diarrhea and abdominal pain.   Genitourinary: Negative for dysuria, urgency and frequency.   Musculoskeletal: Negative for myalgias, back pain and joint pain.   Skin: Negative for rash and itching.   Neurological: Negative for dizziness, tingling, tremors, sensory change, focal weakness and headaches.   Endo/Heme/Allergies: Does not bruise/bleed easily.   Psychiatric/Behavioral: Negative for depression, anxiety, suicidal ideas, insomnia and memory loss.      Exam:  Blood pressure 142/80, pulse 80, temperature 36.3 °C (97.4 °F), resp. rate 16, height 1.626 m (5' 4\"), weight (!) 139.7 kg (308 lb), SpO2 98 %.    General:  Well nourished, well developed in NAD  Head is grossly normal.  Neck: Supple without JVD   Pulmonary:  Normal effort.   Cardiovascular: Regular rate  Extremities: no clubbing, cyanosis, or edema.  Psych: affect appropriate  Skin: cystic acne lesions on the face.     Please note that this dictation was created using voice recognition software. I have made every reasonable attempt to correct obvious errors, but I expect that there are errors of grammar and possibly content that I did not discover before finalizing the note.    Assessment/Plan:  1. " JOSE on CPAP  Continue CPAP  - Suvorexant (BELSOMRA) 20 MG Tab; Take 1 Tab by mouth at bedtime as needed (for insomnia) for up to 90 days. Take 1 tablet by mouth at bedtime as needed for insomnia.  Dispense: 90 Tab; Refill: 3  - REFERRAL TO NUTRITION SERVICES    2. Insomnia, unspecified type  Continue for better adherence of the CPAP  - Suvorexant (BELSOMRA) 20 MG Tab; Take 1 Tab by mouth at bedtime as needed (for insomnia) for up to 90 days. Take 1 tablet by mouth at bedtime as needed for insomnia.  Dispense: 90 Tab; Refill: 3    3. Leg cramps  If not improving with hydration, check  - BASIC METABOLIC PANEL; Future  - MAGNESIUM; Future    4. BMI 50.0-59.9, adult (HCC)  May continue the phentermine, but needs more education on food choices.   - REFERRAL TO NUTRITION SERVICES      5. Cystic acne  Start round of doxycycline

## 2018-11-30 DIAGNOSIS — R10.13 EPIGASTRIC ABDOMINAL PAIN: ICD-10-CM

## 2018-11-30 RX ORDER — OMEPRAZOLE 20 MG/1
CAPSULE, DELAYED RELEASE ORAL
Qty: 90 CAP | Refills: 0 | Status: SHIPPED | OUTPATIENT
Start: 2018-11-30 | End: 2019-02-14 | Stop reason: SDUPTHER

## 2018-12-13 ENCOUNTER — HOSPITAL ENCOUNTER (OUTPATIENT)
Dept: LAB | Facility: MEDICAL CENTER | Age: 53
End: 2018-12-13
Attending: FAMILY MEDICINE
Payer: COMMERCIAL

## 2018-12-13 ENCOUNTER — OFFICE VISIT (OUTPATIENT)
Dept: MEDICAL GROUP | Facility: PHYSICIAN GROUP | Age: 53
End: 2018-12-13
Payer: COMMERCIAL

## 2018-12-13 VITALS
HEIGHT: 64 IN | WEIGHT: 293 LBS | SYSTOLIC BLOOD PRESSURE: 128 MMHG | DIASTOLIC BLOOD PRESSURE: 82 MMHG | RESPIRATION RATE: 18 BRPM | TEMPERATURE: 97.2 F | HEART RATE: 78 BPM | OXYGEN SATURATION: 96 % | BODY MASS INDEX: 50.02 KG/M2

## 2018-12-13 DIAGNOSIS — R25.2 LEG CRAMPS: ICD-10-CM

## 2018-12-13 DIAGNOSIS — R74.8 ALKALINE PHOSPHATASE ELEVATION: ICD-10-CM

## 2018-12-13 DIAGNOSIS — G47.33 OSA ON CPAP: ICD-10-CM

## 2018-12-13 DIAGNOSIS — Z12.39 SCREENING FOR BREAST CANCER: ICD-10-CM

## 2018-12-13 DIAGNOSIS — N28.9 MILD RENAL INSUFFICIENCY: ICD-10-CM

## 2018-12-13 DIAGNOSIS — M79.7 FIBROMYALGIA: ICD-10-CM

## 2018-12-13 LAB
ALBUMIN SERPL BCP-MCNC: 4.1 G/DL (ref 3.2–4.9)
ALBUMIN/GLOB SERPL: 1.2 G/DL
ALP SERPL-CCNC: 127 U/L (ref 30–99)
ALT SERPL-CCNC: 39 U/L (ref 2–50)
ANION GAP SERPL CALC-SCNC: 7 MMOL/L (ref 0–11.9)
AST SERPL-CCNC: 42 U/L (ref 12–45)
BILIRUB SERPL-MCNC: 0.5 MG/DL (ref 0.1–1.5)
BUN SERPL-MCNC: 14 MG/DL (ref 8–22)
CALCIUM SERPL-MCNC: 9.9 MG/DL (ref 8.5–10.5)
CHLORIDE SERPL-SCNC: 104 MMOL/L (ref 96–112)
CO2 SERPL-SCNC: 29 MMOL/L (ref 20–33)
CREAT SERPL-MCNC: 0.98 MG/DL (ref 0.5–1.4)
FASTING STATUS PATIENT QL REPORTED: NORMAL
GGT SERPL-CCNC: 59 U/L (ref 7–34)
GLOBULIN SER CALC-MCNC: 3.3 G/DL (ref 1.9–3.5)
GLUCOSE SERPL-MCNC: 90 MG/DL (ref 65–99)
MAGNESIUM SERPL-MCNC: 2.2 MG/DL (ref 1.5–2.5)
POTASSIUM SERPL-SCNC: 4.5 MMOL/L (ref 3.6–5.5)
PROT SERPL-MCNC: 7.4 G/DL (ref 6–8.2)
SODIUM SERPL-SCNC: 140 MMOL/L (ref 135–145)

## 2018-12-13 PROCEDURE — 83735 ASSAY OF MAGNESIUM: CPT

## 2018-12-13 PROCEDURE — 82977 ASSAY OF GGT: CPT

## 2018-12-13 PROCEDURE — 36415 COLL VENOUS BLD VENIPUNCTURE: CPT

## 2018-12-13 PROCEDURE — 99214 OFFICE O/P EST MOD 30 MIN: CPT | Performed by: FAMILY MEDICINE

## 2018-12-13 PROCEDURE — 80053 COMPREHEN METABOLIC PANEL: CPT

## 2018-12-13 RX ORDER — DULOXETIN HYDROCHLORIDE 30 MG/1
30 CAPSULE, DELAYED RELEASE ORAL DAILY
Qty: 90 CAP | Refills: 3 | Status: SHIPPED | OUTPATIENT
Start: 2018-12-13

## 2018-12-13 RX ORDER — PHENTERMINE HYDROCHLORIDE 37.5 MG/1
37.5 TABLET ORAL
Qty: 90 TAB | Refills: 0 | Status: SHIPPED | OUTPATIENT
Start: 2018-12-13 | End: 2019-02-14 | Stop reason: SDUPTHER

## 2018-12-13 NOTE — PROGRESS NOTES
Chief Complaint   Patient presents with   • Leg Pain     bilateral leg pain       HISTORY OF PRESENT ILLNESS: Patient is a 53 y.o. female established patient here today for the following concerns:    1. Fibromyalgia  Polly is here today for follow-up on her chronic pain that is widespread mostly in muscles occasionally and joints described as burning aching fatigue.  She was started on Cymbalta and titrated up to 60 mg with improvement but not complete resolution of pain.  She is hoping that we might be able to increase it a bit more as she has found some benefit with it.  We have been trying to be very conscientious with what medication to use as she has had difficulty managing her weight.  For this reason gabapentin was not used.    2. Adult BMI 50.0-59.9 kg/sq m (HCC)  In addition we did do a trial of phentermine for the last few months she is down a couple of pounds.  She does not qualify for nutritional counseling due to insurance limitation on benefits.  She cannot afford the counseling on her own.  She reports that yesterday her typical diet included a couple of pop Tart's and a couple different meat choices from a local local barbecue place.  She has not been getting much regular exercise because of chronic pain.  She is hoping to do another consultation with bariatric surgery to see if she would be a prime candidate.    3. Screening for breast cancer  Due for mammography.    4. JOSE on CPAP  She continues on sleep apnea treatment with CPAP.  Good adherence to therapy.  She does still require some Belsomra to help her fall asleep using the apparatus.      Past Medical, Social, and Family history reviewed and updated in EPIC    Allergies:Iodine and Tape    Current Outpatient Prescriptions   Medication Sig Dispense Refill   • DULoxetine (CYMBALTA) 30 MG Cap DR Particles Take 1 Cap by mouth every day. To be used with 60 mg duloxetine for total dose of 90 mg daily 90 Cap 3   • phentermine (ADIPEX-P) 37.5 MG  tablet Take 1 Tab by mouth every morning before breakfast for 90 days. 90 Tab 0   • omeprazole (PRILOSEC) 20 MG delayed-release capsule TAKE 1 CAPSULE BY MOUTH EVERY DAY 90 Cap 0   • Suvorexant (BELSOMRA) 20 MG Tab Take 1 Tab by mouth at bedtime as needed (for insomnia) for up to 90 days. Take 1 tablet by mouth at bedtime as needed for insomnia. 90 Tab 3   • ergocalciferol (DRISDOL) 96679 UNIT capsule Take 1 Cap by mouth every 7 days. 12 Cap 2   • hydrochlorothiazide (MICROZIDE) 12.5 MG capsule TAKE 1 CAPSULE BY MOUTH EVERY DAY 90 Cap 0   • diclofenac EC (VOLTAREN) 75 MG Tablet Delayed Response TAKE 1 TABLET BY MOUTH TWICE DAILY 180 Tab 0   • SYNTHROID 112 MCG Tab TAKE 1 TABLET BY MOUTH EVERY DAY ON AN EMPTY STOMACH 90 Tab 3   • DULoxetine (CYMBALTA) 60 MG Cap DR Particles delayed-release capsule TAKE 1 CAPSULE BY MOUTH EVERY DAY 90 Cap 3   • SYNTHROID 112 MCG Tab TAKE 1 TABLET BY MOUTH EVERY DAY ON AN EMPTY STOMACH 90 Tab 3   • valacyclovir (VALTREX) 1 GM Tab TAKE 1 TABLET BY MOUTH EVERY MORNING 90 Tab 0   • hydrocodone/acetaminophen (NORCO)  MG Tab TK 1 TO 2 TS PO Q 4 H PRN  0     No current facility-administered medications for this visit.          ROS:  Review of Systems   Constitutional: Negative for fever, chills, weight loss and malaise/fatigue.   HENT: Negative for ear pain, nosebleeds, congestion, sore throat and neck pain.    Eyes: Negative for blurred vision.   Respiratory: Negative for cough, sputum production, shortness of breath and wheezing.    Cardiovascular: Negative for chest pain, palpitations,  and leg swelling.   Gastrointestinal: Negative for heartburn, nausea, vomiting, diarrhea and abdominal pain.   Genitourinary: Negative for dysuria, urgency and frequency.   Musculoskeletal: Negative for myalgias, +back pain and joint pain.   Skin: Negative for rash and itching.   Neurological: Negative for dizziness, tingling, tremors, sensory change, focal weakness and headaches.  "  Endo/Heme/Allergies: Does not bruise/bleed easily.   Psychiatric/Behavioral: Negative for depression, anxiety, suicidal ideas, insomnia and memory loss.      Exam:  Blood pressure 128/82, pulse 78, temperature 36.2 °C (97.2 °F), resp. rate 18, height 1.626 m (5' 4\"), weight (!) 138.8 kg (306 lb), SpO2 96 %.    General:  Well nourished, well developed in NAD  Head is grossly normal.  Neck: Supple without JVD   Pulmonary:  Normal effort.   Cardiovascular: Regular rate  Extremities: no clubbing, cyanosis, or edema.  Psych: affect appropriate      Please note that this dictation was created using voice recognition software. I have made every reasonable attempt to correct obvious errors, but I expect that there are errors of grammar and possibly content that I did not discover before finalizing the note.    Assessment/Plan:  1. Fibromyalgia  Increased to  - DULoxetine (CYMBALTA) 30 MG Cap DR Particles; Take 1 Cap by mouth every day. To be used with 60 mg duloxetine for total dose of 90 mg daily  Dispense: 90 Cap; Refill: 3    2. Adult BMI 50.0-59.9 kg/sq m (HCC)  Trial for the next 3 months.  Discussed no further pop Tart's.  Since she does not qualify for nutritional counseling, we will have her bring in a diet log in the next month to help her make healthier choices.  - phentermine (ADIPEX-P) 37.5 MG tablet; Take 1 Tab by mouth every morning before breakfast for 90 days.  Dispense: 90 Tab; Refill: 0  - REFERRAL TO BARIATRIC SURGERY    3. Screening for breast cancer  - MA-SCREENING MAMMO BILAT W/TOMOSYNTHESIS W/CAD; Future    4. JOSE on CPAP  Continue CPAP, weight reduction would greatly help her with this problem.  - REFERRAL TO BARIATRIC SURGERY            "

## 2018-12-26 NOTE — TELEPHONE ENCOUNTER
Was the patient seen in the last year in this department? Yes    Does patient have an active prescription for medications requested? No     Received Request Via: Pharmacy      Pt met protocol?: Yes, last ov 12/13/18  Last labs 12/13/18  BP Readings from Last 1 Encounters:   12/13/18 128/82

## 2018-12-27 RX ORDER — HYDROCHLOROTHIAZIDE 12.5 MG/1
CAPSULE, GELATIN COATED ORAL
Qty: 90 CAP | Refills: 1 | Status: SHIPPED | OUTPATIENT
Start: 2018-12-27 | End: 2019-12-12

## 2018-12-27 RX ORDER — DICLOFENAC SODIUM 75 MG/1
TABLET, DELAYED RELEASE ORAL
Qty: 180 TAB | Refills: 1 | Status: SHIPPED | OUTPATIENT
Start: 2018-12-27 | End: 2019-09-06 | Stop reason: SDUPTHER

## 2018-12-27 NOTE — TELEPHONE ENCOUNTER
Last seen by PCP 12/18. Will send 6 months to pharmacy.  BP Readings from Last 1 Encounters:   12/13/18 128/82       Lab Results   Component Value Date/Time    SODIUM 140 12/13/2018 09:27 AM    POTASSIUM 4.5 12/13/2018 09:27 AM    CHLORIDE 104 12/13/2018 09:27 AM    CO2 29 12/13/2018 09:27 AM    GLUCOSE 90 12/13/2018 09:27 AM    BUN 14 12/13/2018 09:27 AM    CREATININE 0.98 12/13/2018 09:27 AM    CREATININE 1.0 09/28/2007 08:07 AM

## 2019-01-08 NOTE — TELEPHONE ENCOUNTER
Was the patient seen in the last year in this department? Yes    Does patient have an active prescription for medications requested? No     Received Request Via: Pharmacy      Pt met protocol?: Yes    Pt last ov 12/2018 takes for cold sores

## 2019-01-09 RX ORDER — VALACYCLOVIR HYDROCHLORIDE 1 G/1
TABLET, FILM COATED ORAL
Qty: 90 TAB | Refills: 0 | Status: SHIPPED | OUTPATIENT
Start: 2019-01-09

## 2019-01-16 ENCOUNTER — TELEPHONE (OUTPATIENT)
Dept: MEDICAL GROUP | Facility: PHYSICIAN GROUP | Age: 54
End: 2019-01-16

## 2019-01-16 NOTE — TELEPHONE ENCOUNTER
VOICEMAIL  1. Caller Name: Polly                       Call Back Number: [phone] (home)      2. Message: Pt called stating that she has a gaping would and would like to get into  office before her appointment on 01/24/19 or else she will wait until she is seen on at her appt.     3. Patient approves office to leave a detailed voicemail/MyChart message: N\A    LVM to call back and urged pt to be seen in Urgent care at the very least

## 2019-01-16 NOTE — TELEPHONE ENCOUNTER
Patient stated that it was bleeding enough to where she needed stitches.  She stated that she is able to wait until Thursday.

## 2019-01-16 NOTE — TELEPHONE ENCOUNTER
Recommend if it is urgent that she go to urgent care tonight (especially if it needs stitches).  Otherwise see if we have any sameday availability during one of our 40 minute visits please

## 2019-01-24 ENCOUNTER — OFFICE VISIT (OUTPATIENT)
Dept: MEDICAL GROUP | Facility: PHYSICIAN GROUP | Age: 54
End: 2019-01-24
Payer: COMMERCIAL

## 2019-01-24 VITALS
HEIGHT: 64 IN | RESPIRATION RATE: 16 BRPM | TEMPERATURE: 98.2 F | OXYGEN SATURATION: 98 % | HEART RATE: 86 BPM | SYSTOLIC BLOOD PRESSURE: 126 MMHG | DIASTOLIC BLOOD PRESSURE: 72 MMHG | WEIGHT: 293 LBS | BODY MASS INDEX: 50.02 KG/M2

## 2019-01-24 DIAGNOSIS — L73.1 INGROWN HAIR: ICD-10-CM

## 2019-01-24 DIAGNOSIS — K76.0 NAFL (NONALCOHOLIC FATTY LIVER): ICD-10-CM

## 2019-01-24 DIAGNOSIS — L70.0 CYSTIC ACNE: ICD-10-CM

## 2019-01-24 PROCEDURE — 11900 INJECT SKIN LESIONS </W 7: CPT | Performed by: FAMILY MEDICINE

## 2019-01-24 PROCEDURE — 99213 OFFICE O/P EST LOW 20 MIN: CPT | Mod: 25 | Performed by: FAMILY MEDICINE

## 2019-01-24 RX ORDER — DULOXETIN HYDROCHLORIDE 60 MG/1
CAPSULE, DELAYED RELEASE ORAL
COMMUNITY
Start: 2019-01-23 | End: 2019-01-23

## 2019-01-24 RX ORDER — LEVOTHYROXINE SODIUM 112 UG/1
TABLET ORAL
COMMUNITY
Start: 2019-01-23 | End: 2019-01-23

## 2019-01-24 RX ORDER — DULOXETIN HYDROCHLORIDE 30 MG/1
CAPSULE, DELAYED RELEASE ORAL
COMMUNITY
Start: 2019-01-23 | End: 2019-01-23

## 2019-01-24 RX ORDER — OMEPRAZOLE 20 MG/1
CAPSULE, DELAYED RELEASE ORAL
COMMUNITY
Start: 2019-01-23 | End: 2019-01-23

## 2019-01-24 RX ORDER — VALACYCLOVIR HYDROCHLORIDE 1 G/1
TABLET, FILM COATED ORAL
COMMUNITY
Start: 2019-01-23 | End: 2019-01-23

## 2019-01-24 RX ORDER — HYDROCHLOROTHIAZIDE 12.5 MG/1
CAPSULE, GELATIN COATED ORAL
COMMUNITY
Start: 2019-01-23 | End: 2019-01-23

## 2019-01-24 RX ADMIN — TRIAMCINOLONE ACETONIDE 8 MG: 40 INJECTION, SUSPENSION INTRA-ARTICULAR; INTRAMUSCULAR at 12:45

## 2019-01-24 ASSESSMENT — PATIENT HEALTH QUESTIONNAIRE - PHQ9: CLINICAL INTERPRETATION OF PHQ2 SCORE: 0

## 2019-01-24 NOTE — PROGRESS NOTES
"Chief Complaint   Patient presents with   • Results     labs       HISTORY OF PRESENT ILLNESS: Patient is a 53 y.o. female established patient here today for the following concerns:    1. Ingrown hair  Here for evaluation of lesion over the mons pubis.  Reports that it was quite large more than 1 week ago and then seemed to spontaneous rupture with purulent drainage.  No shrinking but still slightly \"purple\" in color.  No further drainage.      2. Cystic acne  Has acne lesion on the chin that has been present for weeks, tender.  Previously we had done a steroid injection in similar lesions with good improvement.  Requests this today.      3. NAFL (nonalcoholic fatty liver)  Labs reviewed today with persistent elevation in ALK phos and + GGT.  Denies any scleral icterus or jaundice.  Reports that she has started the process with bariatric medicine.  She did keep a journal of the food, but her  tossed it out and has been sabotaging her dietary plans.       Past Medical, Social, and Family history reviewed and updated in EPIC    Allergies:Iodine and Tape    Current Outpatient Prescriptions   Medication Sig Dispense Refill   • valacyclovir (VALTREX) 1 GM Tab TAKE 1 TABLET BY MOUTH EVERY MORNING 90 Tab 0   • diclofenac EC (VOLTAREN) 75 MG Tablet Delayed Response TAKE 1 TABLET BY MOUTH TWICE DAILY 180 Tab 1   • hydrochlorothiazide (MICROZIDE) 12.5 MG capsule TAKE 1 CAPSULE BY MOUTH EVERY DAY 90 Cap 1   • DULoxetine (CYMBALTA) 30 MG Cap DR Particles Take 1 Cap by mouth every day. To be used with 60 mg duloxetine for total dose of 90 mg daily 90 Cap 3   • phentermine (ADIPEX-P) 37.5 MG tablet Take 1 Tab by mouth every morning before breakfast for 90 days. 90 Tab 0   • omeprazole (PRILOSEC) 20 MG delayed-release capsule TAKE 1 CAPSULE BY MOUTH EVERY DAY 90 Cap 0   • Suvorexant (BELSOMRA) 20 MG Tab Take 1 Tab by mouth at bedtime as needed (for insomnia) for up to 90 days. Take 1 tablet by mouth at bedtime as needed for " "insomnia. 90 Tab 3   • ergocalciferol (DRISDOL) 15316 UNIT capsule Take 1 Cap by mouth every 7 days. 12 Cap 2   • SYNTHROID 112 MCG Tab TAKE 1 TABLET BY MOUTH EVERY DAY ON AN EMPTY STOMACH 90 Tab 3   • DULoxetine (CYMBALTA) 60 MG Cap DR Particles delayed-release capsule TAKE 1 CAPSULE BY MOUTH EVERY DAY 90 Cap 3   • SYNTHROID 112 MCG Tab TAKE 1 TABLET BY MOUTH EVERY DAY ON AN EMPTY STOMACH 90 Tab 3   • hydrocodone/acetaminophen (NORCO)  MG Tab TK 1 TO 2 TS PO Q 4 H PRN  0     No current facility-administered medications for this visit.          ROS:  Review of Systems   Constitutional: Negative for fever, chills, weight loss and malaise/fatigue.   HENT: Negative for ear pain, nosebleeds, congestion, sore throat and neck pain.    Eyes: Negative for blurred vision.   Respiratory: Negative for cough, sputum production, shortness of breath and wheezing.    Cardiovascular: Negative for chest pain, palpitations,  and leg swelling.   Gastrointestinal: Negative for heartburn, nausea, vomiting, diarrhea and abdominal pain.   Genitourinary: Negative for dysuria, urgency and frequency.   Musculoskeletal: Negative for myalgias, back pain and joint pain.   Skin: Negative for rash and itching.   Neurological: Negative for dizziness, tingling, tremors, sensory change, focal weakness and headaches.   Endo/Heme/Allergies: Does not bruise/bleed easily.   Psychiatric/Behavioral: Negative for depression, anxiety, suicidal ideas, insomnia and memory loss.      Exam:  Blood pressure 126/72, pulse 86, temperature 36.8 °C (98.2 °F), resp. rate 16, height 1.626 m (5' 4\"), weight (!) 141.5 kg (312 lb), SpO2 98 %.    General:  Well nourished, well developed in NAD  Head is grossly normal.  Neck: Supple without JVD   Pulmonary:  Normal effort.   Cardiovascular: Regular rate  Extremities: no clubbing, cyanosis, or edema.  Psych: affect appropriate  Skin: mons with now macular lesion with erythema and central pore  Left chin with cystic " lesion with erythema.     Please note that this dictation was created using voice recognition software. I have made every reasonable attempt to correct obvious errors, but I expect that there are errors of grammar and possibly content that I did not discover before finalizing the note.    Assessment/Plan:  1. Ingrown hair  Observation, warm compress    2. Cystic acne  A steroid injection was performed at left chin using 6.25  mg of Kenalog. This was well tolerated.      3. NAFL (nonalcoholic fatty liver)  Weight reduction. Advised to again record food/drink items.  Follow up with bariatric medicine.

## 2019-02-05 RX ORDER — TRIAMCINOLONE ACETONIDE 40 MG/ML
8 INJECTION, SUSPENSION INTRA-ARTICULAR; INTRAMUSCULAR ONCE
Status: COMPLETED | OUTPATIENT
Start: 2019-02-05 | End: 2019-01-24

## 2019-02-14 ENCOUNTER — HOSPITAL ENCOUNTER (OUTPATIENT)
Dept: LAB | Facility: MEDICAL CENTER | Age: 54
End: 2019-02-14
Attending: FAMILY MEDICINE

## 2019-02-14 ENCOUNTER — OFFICE VISIT (OUTPATIENT)
Dept: MEDICAL GROUP | Facility: PHYSICIAN GROUP | Age: 54
End: 2019-02-14

## 2019-02-14 VITALS
RESPIRATION RATE: 20 BRPM | OXYGEN SATURATION: 96 % | SYSTOLIC BLOOD PRESSURE: 140 MMHG | TEMPERATURE: 97.3 F | BODY MASS INDEX: 50.02 KG/M2 | DIASTOLIC BLOOD PRESSURE: 88 MMHG | HEART RATE: 82 BPM | HEIGHT: 64 IN | WEIGHT: 293 LBS

## 2019-02-14 DIAGNOSIS — E03.9 ACQUIRED HYPOTHYROIDISM: ICD-10-CM

## 2019-02-14 DIAGNOSIS — E55.9 VITAMIN D DEFICIENCY: ICD-10-CM

## 2019-02-14 DIAGNOSIS — L70.0 CYSTIC ACNE: ICD-10-CM

## 2019-02-14 DIAGNOSIS — R10.13 EPIGASTRIC ABDOMINAL PAIN: ICD-10-CM

## 2019-02-14 LAB
25(OH)D3 SERPL-MCNC: 55 NG/ML (ref 30–100)
T3 SERPL-MCNC: 126.2 NG/DL (ref 60–181)
T4 FREE SERPL-MCNC: 1.43 NG/DL (ref 0.53–1.43)
TSH SERPL DL<=0.005 MIU/L-ACNC: 0.86 UIU/ML (ref 0.38–5.33)

## 2019-02-14 PROCEDURE — 84443 ASSAY THYROID STIM HORMONE: CPT

## 2019-02-14 PROCEDURE — 82306 VITAMIN D 25 HYDROXY: CPT

## 2019-02-14 PROCEDURE — 36415 COLL VENOUS BLD VENIPUNCTURE: CPT

## 2019-02-14 PROCEDURE — 84439 ASSAY OF FREE THYROXINE: CPT

## 2019-02-14 PROCEDURE — 84480 ASSAY TRIIODOTHYRONINE (T3): CPT

## 2019-02-14 PROCEDURE — 99214 OFFICE O/P EST MOD 30 MIN: CPT | Performed by: FAMILY MEDICINE

## 2019-02-14 RX ORDER — MINOCYCLINE HYDROCHLORIDE 50 MG/1
50 CAPSULE ORAL 2 TIMES DAILY
Qty: 180 CAP | Refills: 0 | Status: SHIPPED | OUTPATIENT
Start: 2019-02-14 | End: 2019-05-15

## 2019-02-14 RX ORDER — PHENTERMINE HYDROCHLORIDE 37.5 MG/1
37.5 TABLET ORAL
Qty: 90 TAB | Refills: 0 | Status: SHIPPED | OUTPATIENT
Start: 2019-02-14 | End: 2019-05-15

## 2019-02-14 NOTE — PROGRESS NOTES
Chief Complaint   Patient presents with   • Obesity       HISTORY OF PRESENT ILLNESS: Patient is a 53 y.o. female established patient here today for the following concerns:    1. Adult BMI 50.0-59.9 kg/sq m (HCC)  This is a pleasant 53-year-old female who has struggled with many decades of obesity.  She is trying very hard to make improvements despite some sabotage at home by her .  She previously was on phentermine and was having some improved results with weight loss and controlling her appetite.  She ran out of this medication approximately a week ago and reports that her appetite is back and she is started regaining the weight again.  She is in the process of working on bariatric surgery approval.  She continues to work on increasing her exercise with walking when the weather allows.    2. Vitamin D deficiency  Previous history of vitamin D deficiency she has been experiencing some hot flashes, wondering if we have over replaced her vitamin D since being on prescription strength.  Due for recheck.    3. Acquired hypothyroidism  In addition she did have an adjustment on her thyroid medications over the last 6 months.  Due for recheck on her thyroid.  As mentioned above she has been having some increase in hot flashes no palpitations.  She is status post complete hysterectomy with no hormone replacement in many years.  She denies any chest pain shortness of breath when this comes on no associated nausea.  No evidence of anginal equivalent.    4. Cystic acne  Lastly he has had multiple cystic acne lesions over the face posterior auricular areas groin and axillary areas.  She reports that as a teen she had to take Accutane which did help somewhat.  In the past we have had to use Kenalog injections to help reduce the cystic lesions.  She is also been on antibiotics intermittently.  She is wondering if there is anything to help prevent this altogether      Past Medical, Social, and Family history reviewed and  updated in EPIC    Allergies:Iodine and Tape    Current Outpatient Prescriptions   Medication Sig Dispense Refill   • phentermine (ADIPEX-P) 37.5 MG tablet Take 1 Tab by mouth every morning before breakfast for 90 days. 90 Tab 0   • minocycline (MINOCIN) 50 MG Cap Take 1 Cap by mouth 2 times a day for 90 days. 180 Cap 0   • valacyclovir (VALTREX) 1 GM Tab TAKE 1 TABLET BY MOUTH EVERY MORNING 90 Tab 0   • diclofenac EC (VOLTAREN) 75 MG Tablet Delayed Response TAKE 1 TABLET BY MOUTH TWICE DAILY 180 Tab 1   • hydrochlorothiazide (MICROZIDE) 12.5 MG capsule TAKE 1 CAPSULE BY MOUTH EVERY DAY 90 Cap 1   • DULoxetine (CYMBALTA) 30 MG Cap DR Particles Take 1 Cap by mouth every day. To be used with 60 mg duloxetine for total dose of 90 mg daily 90 Cap 3   • omeprazole (PRILOSEC) 20 MG delayed-release capsule TAKE 1 CAPSULE BY MOUTH EVERY DAY 90 Cap 0   • Suvorexant (BELSOMRA) 20 MG Tab Take 1 Tab by mouth at bedtime as needed (for insomnia) for up to 90 days. Take 1 tablet by mouth at bedtime as needed for insomnia. 90 Tab 3   • ergocalciferol (DRISDOL) 58497 UNIT capsule Take 1 Cap by mouth every 7 days. 12 Cap 2   • SYNTHROID 112 MCG Tab TAKE 1 TABLET BY MOUTH EVERY DAY ON AN EMPTY STOMACH 90 Tab 3   • DULoxetine (CYMBALTA) 60 MG Cap DR Particles delayed-release capsule TAKE 1 CAPSULE BY MOUTH EVERY DAY 90 Cap 3   • SYNTHROID 112 MCG Tab TAKE 1 TABLET BY MOUTH EVERY DAY ON AN EMPTY STOMACH 90 Tab 3   • hydrocodone/acetaminophen (NORCO)  MG Tab TK 1 TO 2 TS PO Q 4 H PRN  0     No current facility-administered medications for this visit.          ROS:  Review of Systems   Constitutional: Negative for fever, chills, weight loss and malaise/fatigue.   HENT: Negative for ear pain, nosebleeds, congestion, sore throat and neck pain.    Eyes: Negative for blurred vision.   Respiratory: Negative for cough, sputum production, shortness of breath and wheezing.    Cardiovascular: Negative for chest pain, palpitations,  and leg  "swelling.   Gastrointestinal: Negative for heartburn, nausea, vomiting, diarrhea and abdominal pain.   Genitourinary: Negative for dysuria, urgency and frequency.   Musculoskeletal: Negative for myalgias, back pain and joint pain.   Skin: Negative for rash and itching.   Neurological: Negative for dizziness, tingling, tremors, sensory change, focal weakness and headaches.   Endo/Heme/Allergies: Does not bruise/bleed easily.   Psychiatric/Behavioral: Negative for depression, anxiety, suicidal ideas, insomnia and memory loss.      Exam:  Blood pressure 140/88, pulse 82, temperature 36.3 °C (97.3 °F), resp. rate 20, height 1.626 m (5' 4\"), weight (!) 144.7 kg (319 lb), SpO2 96 %.    General:  Well nourished, well developed in NAD  Head is grossly normal.  Neck: Supple without JVD   Pulmonary:  Normal effort.   Cardiovascular: Regular rate  Extremities: no clubbing, cyanosis, or edema.  Psych: affect appropriate      Please note that this dictation was created using voice recognition software. I have made every reasonable attempt to correct obvious errors, but I expect that there are errors of grammar and possibly content that I did not discover before finalizing the note.    Assessment/Plan:  1. Adult BMI 50.0-59.9 kg/sq m (HCC)  Trial of  - phentermine (ADIPEX-P) 37.5 MG tablet; Take 1 Tab by mouth every morning before breakfast for 90 days.  Dispense: 90 Tab; Refill: 0  Continue with reduced caloric intake, regular exercise and continue with bariatric medicine consultation    2. Vitamin D deficiency  Recheck levels  - VITAMIN D,25 HYDROXY; Future    3. Acquired hypothyroidism  Query adequate dose, recheck levels  - TSH; Future  - FREE THYROXINE; Future  - TRIIDOTHYRONINE; Future    4. Cystic acne  Discussed weight reduction will help with the hyperandrogenism, may consider spironolactone in the future.  We will use minocycline for now.  Suspect that this does have somewhat to do with her chronic granulomatous " disease.  - minocycline (MINOCIN) 50 MG Cap; Take 1 Cap by mouth 2 times a day for 90 days.  Dispense: 180 Cap; Refill: 0    3-month follow-up sooner as needed

## 2019-02-15 DIAGNOSIS — E03.9 ACQUIRED HYPOTHYROIDISM: ICD-10-CM

## 2019-02-15 RX ORDER — LEVOTHYROXINE SODIUM 112 MCG
TABLET ORAL
Qty: 90 TAB | Refills: 3 | Status: SHIPPED | OUTPATIENT
Start: 2019-02-15

## 2019-02-15 RX ORDER — OMEPRAZOLE 20 MG/1
CAPSULE, DELAYED RELEASE ORAL
Qty: 90 CAP | Refills: 0 | Status: SHIPPED | OUTPATIENT
Start: 2019-02-15 | End: 2019-07-09 | Stop reason: SDUPTHER

## 2019-02-28 NOTE — PROGRESS NOTES
Chief Complaint   Patient presents with   • Follow-Up       HISTORY OF PRESENT ILLNESS: Patient is a 51 y.o. female established patient here today for the following concerns:    1. Acquired hypothyroidism  Here today for follow on thyroid.  Last visit we had made some changes.  Due for recheck.    2. Chronic pain of left knee  3. Status post total left knee replacement  Here today to discuss the left knee that is still painful after having had knee replacement a few years ago.  Pain in the knee itself and in the tibia.  Worse with walking or prolonged standing.      4. Positive JETT (antinuclear antibody)  5. Polyarthralgia  Found to have positive JETT, did see rheumatology and was not suspected to have lupus.  Has additional labs pending.  Suspect more fibromyalgia and possibly rosacea.   She is taking the cymbalta.  She reports that when she misses a day of cymbalta the low back pain and hip pain returns quickly.        Past Medical, Social, and Family history reviewed and updated in EPIC    Allergies:Iodine and Tape    Current Outpatient Prescriptions   Medication Sig Dispense Refill   • valacyclovir (VALTREX) 1 GM Tab TAKE 1 TABLET BY MOUTH EVERY MORNING 90 Tab 3   • hydrochlorothiazide (MICROZIDE) 12.5 MG capsule TAKE 1 CAPSULE BY MOUTH EVERY DAY 90 Cap 0   • SYNTHROID 112 MCG Tab Take 1 Tab by mouth Every morning on an empty stomach. 90 Tab 3   • Suvorexant (BELSOMRA) 20 MG Tab Take 1 Tab by mouth at bedtime as needed (for insomnia). Take 1 tablet by mouth at bedtime as needed for insomnia. 30 Tab 3   • duloxetine (CYMBALTA) 60 MG Cap DR Particles delayed-release capsule Take 1 Cap by mouth every day. 90 Cap 3     No current facility-administered medications for this visit.         ROS:  Review of Systems   Constitutional: Negative for fever, chills, weight loss and +malaise/fatigue.   HENT: Negative for ear pain, nosebleeds, congestion, sore throat and neck pain.    Eyes: Negative for blurred vision.  "  Respiratory: Negative for cough, sputum production, shortness of breath and wheezing.    Cardiovascular: Negative for chest pain, palpitations,  and leg swelling.   Gastrointestinal: Negative for heartburn, nausea, vomiting, diarrhea and abdominal pain.   Genitourinary: Negative for dysuria, urgency and frequency.   Musculoskeletal: Negative for myalgias, +back pain and + joint pain.   Skin: Negative for rash and itching.   Neurological: Negative for dizziness, tingling, tremors, sensory change, focal weakness and headaches.   Endo/Heme/Allergies: Does not bruise/bleed easily.   Psychiatric/Behavioral: Negative for depression, anxiety, suicidal ideas, insomnia and memory loss.      Exam:  Blood pressure 120/78, pulse 82, temperature 36.2 °C (97.2 °F), resp. rate 16, height 1.626 m (5' 4.02\"), weight 138.801 kg (306 lb), SpO2 96 %.    General:  Well nourished, well developed in NAD  Head is grossly normal.  Neck: Supple without JVD   Pulmonary:  Normal effort.   Cardiovascular: Regular rate  Extremities: no clubbing, cyanosis, or edema.  Psych: affect appropriate      Please note that this dictation was created using voice recognition software. I have made every reasonable attempt to correct obvious errors, but I expect that there are errors of grammar and possibly content that I did not discover before finalizing the note.    Assessment/Plan:  1. Acquired hypothyroidism  Continue current dose, check levels.   - TSH; Future  - FREE THYROXINE; Future    2. Chronic pain of left knee  Obtain updated imaging.  Patient requests ortho consultation.   - REFERRAL TO ORTHOPEDICS  - DX-KNEE COMPLETE 4+ LEFT; Future    3. Status post total left knee replacement  - REFERRAL TO ORTHOPEDICS  - DX-KNEE COMPLETE 4+ LEFT; Future    4. Positive JETT (antinuclear antibody)  Noted.  Does not meet diagnostic criteria for lupus per rheumatology.  Will continue to monitor and treat myofascial pain    5. Polyarthralgia  Awaiting additional " labs    1 month follow up           independent

## 2019-06-13 RX ORDER — PHENTERMINE HYDROCHLORIDE 37.5 MG/1
TABLET ORAL
Qty: 30 TAB | Refills: 0 | Status: SHIPPED | OUTPATIENT
Start: 2019-06-13 | End: 2019-08-16 | Stop reason: SDUPTHER

## 2019-06-14 ENCOUNTER — TELEPHONE (OUTPATIENT)
Dept: MEDICAL GROUP | Facility: PHYSICIAN GROUP | Age: 54
End: 2019-06-14

## 2019-06-14 NOTE — TELEPHONE ENCOUNTER
1. Caller Name: [facility]                                         Call Back Number:       Patient approves a detailed voicemail message: N\A    Pharmacist called and wanted to confirm the Phentermine RX. RX was writted 1 tab po #30 for 90 day supply. Confirmed with Dr. Young RX should be #30 for 30 days.

## 2019-07-09 DIAGNOSIS — R10.13 EPIGASTRIC ABDOMINAL PAIN: ICD-10-CM

## 2019-07-10 RX ORDER — OMEPRAZOLE 20 MG/1
CAPSULE, DELAYED RELEASE ORAL
Qty: 90 CAP | Refills: 1 | Status: SHIPPED | OUTPATIENT
Start: 2019-07-10

## 2019-07-10 NOTE — TELEPHONE ENCOUNTER
Was the patient seen in the last year in this department? Yes    Does patient have an active prescription for medications requested? No     Received Request Via: Pharmacy    Pt met protocol?: Yes     Last OV 02/14/19

## 2019-07-26 DIAGNOSIS — F51.04 CHRONIC INSOMNIA: ICD-10-CM

## 2019-08-16 ENCOUNTER — OFFICE VISIT (OUTPATIENT)
Dept: MEDICAL GROUP | Facility: PHYSICIAN GROUP | Age: 54
End: 2019-08-16

## 2019-08-16 VITALS
TEMPERATURE: 97 F | HEIGHT: 64 IN | HEART RATE: 80 BPM | SYSTOLIC BLOOD PRESSURE: 150 MMHG | WEIGHT: 293 LBS | OXYGEN SATURATION: 97 % | BODY MASS INDEX: 50.02 KG/M2 | DIASTOLIC BLOOD PRESSURE: 86 MMHG | RESPIRATION RATE: 18 BRPM

## 2019-08-16 DIAGNOSIS — B96.89 BACTERIAL VAGINOSIS: ICD-10-CM

## 2019-08-16 DIAGNOSIS — N76.0 BACTERIAL VAGINOSIS: ICD-10-CM

## 2019-08-16 DIAGNOSIS — R35.0 URINARY FREQUENCY: ICD-10-CM

## 2019-08-16 LAB
APPEARANCE UR: NORMAL
BILIRUB UR STRIP-MCNC: NORMAL MG/DL
COLOR UR AUTO: YELLOW
GLUCOSE UR STRIP.AUTO-MCNC: NORMAL MG/DL
KETONES UR STRIP.AUTO-MCNC: NORMAL MG/DL
LEUKOCYTE ESTERASE UR QL STRIP.AUTO: NORMAL
NITRITE UR QL STRIP.AUTO: NORMAL
PH UR STRIP.AUTO: 6.5 [PH] (ref 5–8)
PROT UR QL STRIP: NORMAL MG/DL
RBC UR QL AUTO: NORMAL
SP GR UR STRIP.AUTO: 1.02
UROBILINOGEN UR STRIP-MCNC: 0.2 MG/DL

## 2019-08-16 PROCEDURE — 99214 OFFICE O/P EST MOD 30 MIN: CPT | Performed by: FAMILY MEDICINE

## 2019-08-16 PROCEDURE — 81002 URINALYSIS NONAUTO W/O SCOPE: CPT | Performed by: FAMILY MEDICINE

## 2019-08-16 RX ORDER — METRONIDAZOLE 500 MG/1
2000 TABLET ORAL ONCE
Qty: 4 TAB | Refills: 1 | Status: SHIPPED | OUTPATIENT
Start: 2019-08-16 | End: 2019-08-16

## 2019-08-16 RX ORDER — MINOCYCLINE HYDROCHLORIDE 50 MG/1
1 CAPSULE ORAL 2 TIMES DAILY
Refills: 0 | COMMUNITY
Start: 2019-06-11 | End: 2019-08-21 | Stop reason: SDUPTHER

## 2019-08-16 RX ORDER — PHENTERMINE HYDROCHLORIDE 37.5 MG/1
TABLET ORAL
Qty: 30 TAB | Refills: 0 | Status: SHIPPED
Start: 2019-08-16 | End: 2019-09-16

## 2019-08-16 NOTE — PROGRESS NOTES
Chief Complaint   Patient presents with   • UTI   • Vaginal Discharge   • Obesity     phentermine       HISTORY OF PRESENT ILLNESS: Patient is a 53 y.o. female established patient here today for the following concerns:    1. Urinary frequency  2. Bacterial vaginosis    Here today for concern over urinary frequency burning and vaginal discharge, noted to be worse after intercourse.  Seems similar to previous episodes of gardnerella.      3. Obesity  In addition, hoping to restart the phentermine for weight reduction.  Has been working on weight  Loss with increasing activity around the house and reducing processed foods.        Past Medical, Social, and Family history reviewed and updated in EPIC    Allergies:Iodine and Tape    Current Outpatient Medications   Medication Sig Dispense Refill   • metroNIDAZOLE (FLAGYL) 500 MG Tab Take 4 Tabs by mouth Once for 1 dose. 4 Tab 1   • Suvorexant (BELSOMRA) 20 MG Tab Take 1 Tab by mouth at bedtime as needed for up to 90 days. 90 Tab 0   • omeprazole (PRILOSEC) 20 MG delayed-release capsule TAKE 1 CAPSULE BY MOUTH EVERY DAY 90 Cap 1   • SYNTHROID 112 MCG Tab Take 1 tab PO daily, skip Fridays 90 Tab 3   • valacyclovir (VALTREX) 1 GM Tab TAKE 1 TABLET BY MOUTH EVERY MORNING 90 Tab 0   • diclofenac EC (VOLTAREN) 75 MG Tablet Delayed Response TAKE 1 TABLET BY MOUTH TWICE DAILY 180 Tab 1   • hydrochlorothiazide (MICROZIDE) 12.5 MG capsule TAKE 1 CAPSULE BY MOUTH EVERY DAY 90 Cap 1   • DULoxetine (CYMBALTA) 30 MG Cap DR Particles Take 1 Cap by mouth every day. To be used with 60 mg duloxetine for total dose of 90 mg daily 90 Cap 3   • ergocalciferol (DRISDOL) 49092 UNIT capsule Take 1 Cap by mouth every 7 days. 12 Cap 2   • SYNTHROID 112 MCG Tab TAKE 1 TABLET BY MOUTH EVERY DAY ON AN EMPTY STOMACH 90 Tab 3   • DULoxetine (CYMBALTA) 60 MG Cap DR Particles delayed-release capsule TAKE 1 CAPSULE BY MOUTH EVERY DAY 90 Cap 3   • minocycline (MINOCIN) 50 MG Cap Take 1 Cap by mouth 2 Times  "a Day.  0   • hydrocodone/acetaminophen (NORCO)  MG Tab TK 1 TO 2 TS PO Q 4 H PRN  0     No current facility-administered medications for this visit.          ROS:  Review of Systems   Constitutional: Negative for fever, chills, weight loss and malaise/fatigue.   HENT: Negative for ear pain, nosebleeds, congestion, sore throat and neck pain.    Eyes: Negative for blurred vision.   Respiratory: Negative for cough, sputum production, shortness of breath and wheezing.    Cardiovascular: Negative for chest pain, palpitations,  and leg swelling.   Gastrointestinal: Negative for heartburn, nausea, vomiting, diarrhea and abdominal pain.   Genitourinary: Negative for dysuria, urgency and frequency.   Musculoskeletal: Negative for myalgias, back pain and joint pain.   Skin: Negative for rash and itching.   Neurological: Negative for dizziness, tingling, tremors, sensory change, focal weakness and headaches.   Endo/Heme/Allergies: Does not bruise/bleed easily.   Psychiatric/Behavioral: Negative for depression, anxiety, suicidal ideas, insomnia and memory loss.      Exam:  /86 (BP Location: Left arm, Patient Position: Sitting, BP Cuff Size: Large adult)   Pulse 80   Temp 36.1 °C (97 °F)   Resp 18   Ht 1.626 m (5' 4\")   Wt (!) 142 kg (313 lb)   SpO2 97%     General:  Well nourished, well developed in NAD  Head is grossly normal.  Neck: Supple without JVD   Pulmonary:  Normal effort.   Cardiovascular: Regular rate  Extremities: no clubbing, cyanosis, or edema.  Psych: affect appropriate      Please note that this dictation was created using voice recognition software. I have made every reasonable attempt to correct obvious errors, but I expect that there are errors of grammar and possibly content that I did not discover before finalizing the note.    Assessment/Plan:  1. Urinary frequency  - POCT Urinalysis  - metroNIDAZOLE (FLAGYL) 500 MG Tab; Take 4 Tabs by mouth Once for 1 dose.  Dispense: 4 Tab; Refill: " 1    2. Bacterial vaginosis  - metroNIDAZOLE (FLAGYL) 500 MG Tab; Take 4 Tabs by mouth Once for 1 dose.  Dispense: 4 Tab; Refill: 1        3. BMI 50.0-59.9, adult (HCC)  - phentermine (ADIPEX-P) 37.5 MG tablet; TAKE 1 TABLET BY MOUTH EVERY MORNING BEFORE BREAKFAST FOR 30 DAYS  Dispense: 30 Tab; Refill: 0

## 2019-08-21 RX ORDER — MINOCYCLINE HYDROCHLORIDE 50 MG/1
50 CAPSULE ORAL 2 TIMES DAILY
Qty: 180 CAP | Refills: 0 | Status: SHIPPED | OUTPATIENT
Start: 2019-08-21

## 2019-08-21 NOTE — TELEPHONE ENCOUNTER
Was the patient seen in the last year in this department? Yes    Does patient have an active prescription for medications requested? No     Received Request Via: Pharmacy      Pt met protocol?: Yes   Pt last ov 8/2019

## 2019-09-09 RX ORDER — DICLOFENAC SODIUM 75 MG/1
TABLET, DELAYED RELEASE ORAL
Qty: 180 TAB | Refills: 1 | Status: SHIPPED | OUTPATIENT
Start: 2019-09-09

## 2019-09-09 NOTE — TELEPHONE ENCOUNTER
Was the patient seen in the last year in this department? Yes    Does patient have an active prescription for medications requested? No     Received Request Via: Pharmacy      Pt met protocol?: Yes    OV 8/19

## 2019-10-05 DIAGNOSIS — M25.50 POLYARTHRALGIA: ICD-10-CM

## 2019-10-07 RX ORDER — DULOXETIN HYDROCHLORIDE 60 MG/1
CAPSULE, DELAYED RELEASE ORAL
Qty: 90 CAP | Refills: 1 | Status: SHIPPED | OUTPATIENT
Start: 2019-10-07

## 2019-11-04 DIAGNOSIS — G47.33 OSA ON CPAP: ICD-10-CM

## 2019-11-05 RX ORDER — PHENTERMINE HYDROCHLORIDE 37.5 MG/1
TABLET ORAL
Qty: 30 TAB | Refills: 0 | Status: SHIPPED | OUTPATIENT
Start: 2019-11-05 | End: 2019-12-05

## 2019-12-12 RX ORDER — HYDROCHLOROTHIAZIDE 12.5 MG/1
CAPSULE, GELATIN COATED ORAL
Qty: 90 CAP | Refills: 1 | Status: SHIPPED | OUTPATIENT
Start: 2019-12-12

## 2019-12-13 NOTE — TELEPHONE ENCOUNTER
Above RN's/Staff's notes reviewed.  Reviewed medications and allergies.    Lynn Rodriges is a 77 year old female presenting with improvement with zpak  But same cough and sputum recurring after finishing meds    Non smoker    No fever or chills    Duration up to 2 weeks    PE:  Visit Vitals   • /68   • Pulse 80   • Temp 99.8 °F (37.7 °C) (Tympanic)   • Wt 94 kg   • SpO2 95%       No respiratory distress  Lungs few rhonch, no wheezing or rales  Cards RRR  HEENT normal    ASSESSMENT: (J20.9) Acute bronchitis, unspecified organism  (primary encounter diagnosis)  Comment:     Plan:     See meds below  Rest and increase activity as tolerated.       Refill X 6 months, sent to pharmacy.Pt. Seen in the last 6 months per protocol.   Lab Results   Component Value Date/Time    SODIUM 140 12/13/2018 09:27 AM    POTASSIUM 4.5 12/13/2018 09:27 AM    CHLORIDE 104 12/13/2018 09:27 AM    CO2 29 12/13/2018 09:27 AM    GLUCOSE 90 12/13/2018 09:27 AM    BUN 14 12/13/2018 09:27 AM    CREATININE 0.98 12/13/2018 09:27 AM    CREATININE 1.0 09/28/2007 08:07 AM

## 2020-01-14 ENCOUNTER — PATIENT MESSAGE (OUTPATIENT)
Dept: SLEEP MEDICINE | Facility: MEDICAL CENTER | Age: 55
End: 2020-01-14

## 2025-02-17 NOTE — TELEPHONE ENCOUNTER
A surgery clearance request has been scanned into the patients media.  Would you like to see the patient for a visit?  Please advise.  Thank you.   denies pain/discomfort (Rating = 0)